# Patient Record
Sex: MALE | Race: WHITE | Employment: FULL TIME | URBAN - NONMETROPOLITAN AREA
[De-identification: names, ages, dates, MRNs, and addresses within clinical notes are randomized per-mention and may not be internally consistent; named-entity substitution may affect disease eponyms.]

---

## 2019-07-13 ENCOUNTER — HOSPITAL ENCOUNTER (EMERGENCY)
Age: 29
Discharge: HOME | End: 2019-07-13
Payer: COMMERCIAL

## 2019-07-13 VITALS
WEIGHT: 182 LBS | HEART RATE: 72 BPM | DIASTOLIC BLOOD PRESSURE: 93 MMHG | SYSTOLIC BLOOD PRESSURE: 141 MMHG | OXYGEN SATURATION: 98 % | HEIGHT: 70 IN | TEMPERATURE: 98.2 F | RESPIRATION RATE: 16 BRPM | BODY MASS INDEX: 26.05 KG/M2

## 2019-07-13 DIAGNOSIS — K02.9 PAIN DUE TO DENTAL CARIES: Primary | ICD-10-CM

## 2019-07-13 PROCEDURE — 99202 OFFICE O/P NEW SF 15 MIN: CPT

## 2019-07-13 PROCEDURE — 99214 OFFICE O/P EST MOD 30 MIN: CPT | Performed by: NURSE PRACTITIONER

## 2019-07-13 RX ORDER — CLINDAMYCIN HYDROCHLORIDE 300 MG/1
300 CAPSULE ORAL 3 TIMES DAILY
Qty: 30 CAPSULE | Refills: 0 | Status: SHIPPED | OUTPATIENT
Start: 2019-07-13 | End: 2019-07-23

## 2019-07-13 RX ORDER — NAPROXEN 500 MG/1
500 TABLET ORAL 2 TIMES DAILY WITH MEALS
Qty: 60 TABLET | Refills: 0 | Status: SHIPPED | OUTPATIENT
Start: 2019-07-13 | End: 2022-01-14 | Stop reason: ALTCHOICE

## 2019-07-13 RX ORDER — CETIRIZINE HYDROCHLORIDE 10 MG/1
10 TABLET ORAL DAILY
COMMUNITY

## 2019-07-13 RX ORDER — LIDOCAINE HYDROCHLORIDE 20 MG/ML
1 SOLUTION OROPHARYNGEAL PRN
Qty: 15 ML | Refills: 0 | Status: SHIPPED | OUTPATIENT
Start: 2019-07-13 | End: 2022-01-14 | Stop reason: ALTCHOICE

## 2019-07-13 ASSESSMENT — PAIN DESCRIPTION - LOCATION: LOCATION: TEETH

## 2019-07-13 ASSESSMENT — PAIN DESCRIPTION - FREQUENCY: FREQUENCY: INTERMITTENT

## 2019-07-13 ASSESSMENT — PAIN SCALES - GENERAL: PAINLEVEL_OUTOF10: 5

## 2019-07-13 ASSESSMENT — ENCOUNTER SYMPTOMS
GASTROINTESTINAL NEGATIVE: 1
EYES NEGATIVE: 1
STRIDOR: 0
SHORTNESS OF BREATH: 0
COUGH: 0

## 2019-07-13 ASSESSMENT — PAIN DESCRIPTION - PAIN TYPE: TYPE: ACUTE PAIN

## 2019-07-13 ASSESSMENT — PAIN DESCRIPTION - DESCRIPTORS: DESCRIPTORS: THROBBING

## 2019-07-13 NOTE — ED PROVIDER NOTES
[phone]       Chief Complaint   Patient presents with    Dental Pain     upper left        Nurses Notes reviewed and I agree except as noted in the HPI. HISTORY OF PRESENT ILLNESS   Miles Rojas is a 30 y.o. male who presents   Dental Pain   Location:  Upper and lower  Upper teeth location:  15/SRI 2nd molar and 16/SRI 3rd molar  Lower teeth location:  17/LL 3rd molar and 18/LL 2nd molar  Quality:  Aching, dull, pressure-like, radiating, sharp, shooting and throbbing  Severity:  Moderate  Onset quality:  Gradual  Duration:  2 weeks  Timing:  Intermittent  Progression:  Worsening  Chronicity:  New  Context: cap fell off and poor dentition    Context: not dental fracture, not malocclusion and not trauma    Previous work-up:  Dental exam  Relieved by:  None tried  Worsened by: Hot food/drink, cold food/drink, touching and jaw movement  Ineffective treatments:  None tried  Associated symptoms: no congestion, no drooling, no facial pain, no fever, no gum swelling, no headaches, no neck swelling, no oral bleeding and no oral lesions    Risk factors: periodontal disease    Risk factors: no diabetes          REVIEW OF SYSTEMS     Review of Systems   Constitutional: Positive for appetite change. Negative for activity change and fever. HENT: Positive for dental problem and ear pain. Negative for congestion, drooling and mouth sores. Eyes: Negative. Respiratory: Negative for cough, shortness of breath and stridor. Cardiovascular: Negative for chest pain and leg swelling. Gastrointestinal: Negative. Endocrine: Negative for cold intolerance and heat intolerance. Genitourinary: Negative. Allergic/Immunologic: Negative for environmental allergies and food allergies. Neurological: Negative for headaches. Hematological: Negative for adenopathy. Does not bruise/bleed easily. Psychiatric/Behavioral: Negative.         PAST MEDICAL

## 2019-07-13 NOTE — ED TRIAGE NOTES
Pt ambulatory into [facility] with c/o upper left tooth pain for the past three or four days. Pt states pain has been intermittent but has gotten worse today and is sensitive to cold water. Pt states pain 5. Pt has dentist at [facility] and has not tried to contact them since a weekend.

## 2019-10-25 ENCOUNTER — HOSPITAL ENCOUNTER (EMERGENCY)
Age: 29
Discharge: HOME | End: 2019-10-25
Attending: EMERGENCY MEDICINE
Payer: COMMERCIAL

## 2019-10-25 VITALS
OXYGEN SATURATION: 96 % | HEART RATE: 76 BPM | DIASTOLIC BLOOD PRESSURE: 84 MMHG | SYSTOLIC BLOOD PRESSURE: 137 MMHG | BODY MASS INDEX: 26.26 KG/M2 | TEMPERATURE: 97.3 F | RESPIRATION RATE: 16 BRPM | WEIGHT: 183 LBS

## 2019-10-25 DIAGNOSIS — J20.9 ACUTE BRONCHITIS DUE TO INFECTION: ICD-10-CM

## 2019-10-25 DIAGNOSIS — J01.00 ACUTE MAXILLARY SINUSITIS, RECURRENCE NOT SPECIFIED: Primary | ICD-10-CM

## 2019-10-25 PROCEDURE — 99213 OFFICE O/P EST LOW 20 MIN: CPT | Performed by: EMERGENCY MEDICINE

## 2019-10-25 PROCEDURE — 99212 OFFICE O/P EST SF 10 MIN: CPT

## 2019-10-25 RX ORDER — DOXYCYCLINE HYCLATE 100 MG
100 TABLET ORAL 2 TIMES DAILY
Qty: 14 TABLET | Refills: 0 | Status: SHIPPED | OUTPATIENT
Start: 2019-10-25 | End: 2019-11-01

## 2019-10-25 RX ORDER — PROMETHAZINE HYDROCHLORIDE AND CODEINE PHOSPHATE 6.25; 1 MG/5ML; MG/5ML
5 SYRUP ORAL 4 TIMES DAILY PRN
Qty: 120 ML | Refills: 0 | Status: SHIPPED | OUTPATIENT
Start: 2019-10-25 | End: 2019-10-29

## 2019-10-25 RX ORDER — CETIRIZINE HYDROCHLORIDE, PSEUDOEPHEDRINE HYDROCHLORIDE 5; 120 MG/1; MG/1
1 TABLET, FILM COATED, EXTENDED RELEASE ORAL 2 TIMES DAILY
Qty: 30 TABLET | Refills: 0 | Status: SHIPPED | OUTPATIENT
Start: 2019-10-25 | End: 2019-11-09

## 2019-10-25 ASSESSMENT — ENCOUNTER SYMPTOMS
SINUS PRESSURE: 1
STRIDOR: 0
TROUBLE SWALLOWING: 0
EYE PAIN: 0
RHINORRHEA: 1
SINUS PAIN: 1
COUGH: 1
VOMITING: 0
VOICE CHANGE: 1
SHORTNESS OF BREATH: 0
EYE DISCHARGE: 0
BACK PAIN: 0
SORE THROAT: 1
ABDOMINAL PAIN: 0
WHEEZING: 0
EYE REDNESS: 0
NAUSEA: 0
DIARRHEA: 0

## 2019-10-25 ASSESSMENT — PAIN SCALES - GENERAL: PAINLEVEL_OUTOF10: 3

## 2019-10-25 ASSESSMENT — PAIN DESCRIPTION - LOCATION: LOCATION: THROAT

## 2019-10-25 ASSESSMENT — PAIN DESCRIPTION - PAIN TYPE: TYPE: ACUTE PAIN

## 2020-09-21 ENCOUNTER — HOSPITAL ENCOUNTER (EMERGENCY)
Age: 30
Discharge: HOME | End: 2020-09-21
Payer: COMMERCIAL

## 2020-09-21 VITALS
HEART RATE: 95 BPM | RESPIRATION RATE: 16 BRPM | SYSTOLIC BLOOD PRESSURE: 141 MMHG | TEMPERATURE: 98.1 F | WEIGHT: 171 LBS | BODY MASS INDEX: 23.94 KG/M2 | DIASTOLIC BLOOD PRESSURE: 78 MMHG | HEIGHT: 71 IN | OXYGEN SATURATION: 97 %

## 2020-09-21 LAB
FLU A ANTIGEN: NEGATIVE
GROUP A STREP CULTURE, REFLEX: NEGATIVE
INFLUENZA B AG, EIA: POSITIVE
REFLEX THROAT C + S: NORMAL

## 2020-09-21 PROCEDURE — 99214 OFFICE O/P EST MOD 30 MIN: CPT | Performed by: NURSE PRACTITIONER

## 2020-09-21 PROCEDURE — 87070 CULTURE OTHR SPECIMN AEROBIC: CPT

## 2020-09-21 PROCEDURE — 87804 INFLUENZA ASSAY W/OPTIC: CPT

## 2020-09-21 PROCEDURE — 87880 STREP A ASSAY W/OPTIC: CPT

## 2020-09-21 PROCEDURE — 99213 OFFICE O/P EST LOW 20 MIN: CPT

## 2020-09-21 RX ORDER — OSELTAMIVIR PHOSPHATE 75 MG/1
75 CAPSULE ORAL 2 TIMES DAILY
Qty: 10 CAPSULE | Refills: 0 | Status: SHIPPED | OUTPATIENT
Start: 2020-09-21 | End: 2020-09-26

## 2020-09-21 RX ORDER — BROMPHENIRAMINE MALEATE, PSEUDOEPHEDRINE HYDROCHLORIDE, AND DEXTROMETHORPHAN HYDROBROMIDE 2; 30; 10 MG/5ML; MG/5ML; MG/5ML
10 SYRUP ORAL 4 TIMES DAILY PRN
Qty: 118 ML | Refills: 0 | Status: SHIPPED | OUTPATIENT
Start: 2020-09-21 | End: 2022-01-14 | Stop reason: ALTCHOICE

## 2020-09-21 ASSESSMENT — ENCOUNTER SYMPTOMS
SORE THROAT: 1
EYE ITCHING: 0
EYE REDNESS: 0
COUGH: 1
SINUS PRESSURE: 0
WHEEZING: 0
ABDOMINAL PAIN: 0
NAUSEA: 0
VOMITING: 0
SHORTNESS OF BREATH: 0
DIARRHEA: 0

## 2020-09-21 ASSESSMENT — PAIN SCALES - GENERAL: PAINLEVEL_OUTOF10: 3

## 2020-09-21 ASSESSMENT — PAIN - FUNCTIONAL ASSESSMENT: PAIN_FUNCTIONAL_ASSESSMENT: ACTIVITIES ARE NOT PREVENTED

## 2020-09-21 ASSESSMENT — PAIN DESCRIPTION - LOCATION: LOCATION: THROAT

## 2020-09-21 ASSESSMENT — PAIN DESCRIPTION - DESCRIPTORS: DESCRIPTORS: DISCOMFORT

## 2020-09-21 ASSESSMENT — PAIN DESCRIPTION - PAIN TYPE: TYPE: ACUTE PAIN

## 2020-09-21 NOTE — ED NOTES
Pt verbalized discharge instructions. Pt informed to go to ER if develop chest pain, shortness of breath or abdominal pain. Pt ambulatory out in stable condition. Assessment unchanged.        Jenn Schilling RN  09/21/20 [id number]

## 2020-09-21 NOTE — ED PROVIDER NOTES
[address]       Chief Complaint   Patient presents with    Cough    Pharyngitis       Nurses Notes reviewed and I agree except as noted in the HPI. HISTORY OF PRESENT ILLNESS   Henrique Brown is a 26 y.o. male who presents for evaluation of a fever, cough, and sore throat that started three days ago. He states that he works out at [employer] , and when reporting for work the other day, he went through the [employer] and was told by the  that he had a temperature of 100 °F.    Patient/patient representative denies any foreign or domestic travel in the last 30 days. Patient /patient representative denies exposure to those with similar symptoms. Patient/patient representative denies contact with someone who was confirmed or suspected to have Coronavirus / COVID-19 within the last month. REVIEW OF SYSTEMS     Review of Systems   Constitutional: Positive for chills and fatigue. Negative for fever. HENT: Positive for sore throat. Negative for congestion, ear pain and sinus pressure. Eyes: Negative for redness and itching. Respiratory: Positive for cough. Negative for shortness of breath and wheezing. Cardiovascular: Negative for chest pain and palpitations. Gastrointestinal: Negative for abdominal pain, diarrhea, nausea and vomiting. Musculoskeletal: Positive for myalgias. Negative for joint swelling. Skin: Negative for rash. Allergic/Immunologic: Negative for environmental allergies and food allergies. Neurological: Negative for dizziness and headaches. PAST MEDICAL HISTORY   History reviewed. No pertinent past medical history. SURGICAL HISTORY     Patient  has a past surgical history that includes Ankle surgery and back surgery.     CURRENT MEDICATIONS       Previous Medications    CETIRIZINE (ZYRTEC) 10 MG TABLET    Take 10 mg by mouth daily    LIDOCAINE VISCOUS HCL (XYLOCAINE) 2 % SOLN SOLUTION

## 2020-09-23 LAB — THROAT/NOSE CULTURE: NORMAL

## 2022-01-14 NOTE — PROGRESS NOTES
In preparation for their surgical procedure above patient was screened for Obstructive Sleep Apnea (POPPY) using the STOP-Bang Questionnaire by the Pre-Admission Testing department. This is a pre-surgical screening tool for patient safety and serves as a recommendation, this WILL NOT cause cancellation of surgery. STOP-Bang Questionnaire  * Do you currently see a pulmonologist?  No     If yes STOP, do not complete. Patient follows with  .    1. Do you snore loudly (able to be heard in the next room)? No    2. Do you often feel tired or sleepy during the daytime? No       3. Has anyone ever told you that you stop breathing during your sleep? No    4. Do you have or are you being treated for high blood pressure? No      5. BMI more than 35? BMI (Calculated): 25.9        No    6. Age over 48 years? 33 y.o. No    7. Neck Circumference greater than 17 inches for male or 16 inches for female? Measured           (visits only)            No    8. Gender Male? Yes      TOTAL SCORE: 1    POPPY - Low Risk : Yes to 0 - 2 questions  POPPY - Intermediate Risk : Yes to 3 - 4 questions  POPPY - High Risk : Yes to 5 - 8 questions    Adapted from:   STOP Questionnaire: A Tool to Screen Patients for Obstructive Sleep Apnea   EBONIE AndersonC.P.C., HUAN Roy.B.B.S., Dalila Osborne M.D., Gisselle Evans. Riaz Lee, Ph.D., HUAN Odonnell.B.B.S., Paulina Donato M.Sc., Tai Tomas M.D., Papa Butts. DEB Donald.P.C.    Anesthesiology 2008; 247:076-13 Copyright 2008, the [address] of Anesthesiologists, [facility].   ----------------------------------------------------------------------------------------------------------------

## 2022-01-14 NOTE — PROGRESS NOTES
Following instructions given to patient, who states understanding:     NPO after midnight  [employer] and 's license  Wear comfortable clean clothing  Do not bring jewelry   Shower night before and morning of surgery with a liquid antibacterial soap  Bring medications in original bottles  Follow all instructions given by your physician   needed at discharge  Call [phone] for any questions  Report to [facility] on 2nd floor  If you would become ill prior to surgery, please call the surgeon  May have only 1 visitor accompany you for surgery  Please bring and wear mask  You will be receiving a phone call one or two days before surgery to review covid screening exposure     Covid screening questionnaire complete and negative for symptoms or exposure see chart for documentation.    Please limit your exposure to the public after you have your covid test  Please call your doctor immediately if you develop any symptoms of covid prior to your surgery

## 2022-01-24 ENCOUNTER — ANESTHESIA EVENT (OUTPATIENT)
Dept: OPERATING ROOM | Age: 32
End: 2022-01-24
Payer: COMMERCIAL

## 2022-01-27 ENCOUNTER — HOSPITAL ENCOUNTER (OUTPATIENT)
Age: 32
Setting detail: OUTPATIENT SURGERY
Discharge: HOME | End: 2022-01-27
Attending: ORTHOPAEDIC SURGERY | Admitting: ORTHOPAEDIC SURGERY
Payer: COMMERCIAL

## 2022-01-27 ENCOUNTER — ANESTHESIA (OUTPATIENT)
Dept: OPERATING ROOM | Age: 32
End: 2022-01-27
Payer: COMMERCIAL

## 2022-01-27 VITALS — OXYGEN SATURATION: 97 % | TEMPERATURE: 96.3 F | SYSTOLIC BLOOD PRESSURE: 124 MMHG | DIASTOLIC BLOOD PRESSURE: 60 MMHG

## 2022-01-27 VITALS
BODY MASS INDEX: 25.96 KG/M2 | OXYGEN SATURATION: 96 % | WEIGHT: 185.4 LBS | HEART RATE: 62 BPM | DIASTOLIC BLOOD PRESSURE: 81 MMHG | SYSTOLIC BLOOD PRESSURE: 132 MMHG | HEIGHT: 71 IN | RESPIRATION RATE: 16 BRPM | TEMPERATURE: 97.9 F

## 2022-01-27 DIAGNOSIS — G89.18 POST-OP PAIN: Primary | ICD-10-CM

## 2022-01-27 PROCEDURE — 3600000014 HC SURGERY LEVEL 4 ADDTL 15MIN: Performed by: ORTHOPAEDIC SURGERY

## 2022-01-27 PROCEDURE — 3700000000 HC ANESTHESIA ATTENDED CARE: Performed by: ORTHOPAEDIC SURGERY

## 2022-01-27 PROCEDURE — 6360000002 HC RX W HCPCS: Performed by: NURSE ANESTHETIST, CERTIFIED REGISTERED

## 2022-01-27 PROCEDURE — 6360000002 HC RX W HCPCS: Performed by: ORTHOPAEDIC SURGERY

## 2022-01-27 PROCEDURE — 2500000003 HC RX 250 WO HCPCS: Performed by: ORTHOPAEDIC SURGERY

## 2022-01-27 PROCEDURE — C1713 ANCHOR/SCREW BN/BN,TIS/BN: HCPCS | Performed by: ORTHOPAEDIC SURGERY

## 2022-01-27 PROCEDURE — 3600000004 HC SURGERY LEVEL 4 BASE: Performed by: ORTHOPAEDIC SURGERY

## 2022-01-27 PROCEDURE — 2500000003 HC RX 250 WO HCPCS: Performed by: NURSE ANESTHETIST, CERTIFIED REGISTERED

## 2022-01-27 PROCEDURE — 3700000001 HC ADD 15 MINUTES (ANESTHESIA): Performed by: ORTHOPAEDIC SURGERY

## 2022-01-27 PROCEDURE — 64415 NJX AA&/STRD BRCH PLXS IMG: CPT | Performed by: STUDENT IN AN ORGANIZED HEALTH CARE EDUCATION/TRAINING PROGRAM

## 2022-01-27 PROCEDURE — 7100000010 HC PHASE II RECOVERY - FIRST 15 MIN: Performed by: ORTHOPAEDIC SURGERY

## 2022-01-27 PROCEDURE — 2580000003 HC RX 258: Performed by: ORTHOPAEDIC SURGERY

## 2022-01-27 PROCEDURE — 7100000000 HC PACU RECOVERY - FIRST 15 MIN: Performed by: ORTHOPAEDIC SURGERY

## 2022-01-27 PROCEDURE — 6360000002 HC RX W HCPCS: Performed by: STUDENT IN AN ORGANIZED HEALTH CARE EDUCATION/TRAINING PROGRAM

## 2022-01-27 PROCEDURE — 7100000011 HC PHASE II RECOVERY - ADDTL 15 MIN: Performed by: ORTHOPAEDIC SURGERY

## 2022-01-27 PROCEDURE — 7100000001 HC PACU RECOVERY - ADDTL 15 MIN: Performed by: ORTHOPAEDIC SURGERY

## 2022-01-27 PROCEDURE — 2709999900 HC NON-CHARGEABLE SUPPLY: Performed by: ORTHOPAEDIC SURGERY

## 2022-01-27 PROCEDURE — 2720000010 HC SURG SUPPLY STERILE: Performed by: ORTHOPAEDIC SURGERY

## 2022-01-27 DEVICE — 1.4MM ICONIX 1 ANCHOR W/INTELLIBRAID TECHNOLOGY 1 STRAND #2 FORCE FIBER
Type: IMPLANTABLE DEVICE | Site: SHOULDER | Status: FUNCTIONAL
Brand: ICONIX

## 2022-01-27 RX ORDER — ONDANSETRON 2 MG/ML
4 INJECTION INTRAMUSCULAR; INTRAVENOUS
Status: DISCONTINUED | OUTPATIENT
Start: 2022-01-27 | End: 2022-01-27 | Stop reason: HOSPADM

## 2022-01-27 RX ORDER — EPINEPHRINE 1 MG/ML
INJECTION, SOLUTION, CONCENTRATE INTRAVENOUS PRN
Status: DISCONTINUED | OUTPATIENT
Start: 2022-01-27 | End: 2022-01-27 | Stop reason: ALTCHOICE

## 2022-01-27 RX ORDER — ONDANSETRON 2 MG/ML
INJECTION INTRAMUSCULAR; INTRAVENOUS PRN
Status: DISCONTINUED | OUTPATIENT
Start: 2022-01-27 | End: 2022-01-27 | Stop reason: SDUPTHER

## 2022-01-27 RX ORDER — ROPIVACAINE HYDROCHLORIDE 5 MG/ML
INJECTION, SOLUTION EPIDURAL; INFILTRATION; PERINEURAL
Status: COMPLETED | OUTPATIENT
Start: 2022-01-27 | End: 2022-01-27

## 2022-01-27 RX ORDER — FENTANYL CITRATE 50 UG/ML
25 INJECTION, SOLUTION INTRAMUSCULAR; INTRAVENOUS EVERY 5 MIN PRN
Status: DISCONTINUED | OUTPATIENT
Start: 2022-01-27 | End: 2022-01-27 | Stop reason: HOSPADM

## 2022-01-27 RX ORDER — PROPOFOL 10 MG/ML
INJECTION, EMULSION INTRAVENOUS PRN
Status: DISCONTINUED | OUTPATIENT
Start: 2022-01-27 | End: 2022-01-27 | Stop reason: SDUPTHER

## 2022-01-27 RX ORDER — DIPHENHYDRAMINE HYDROCHLORIDE 50 MG/ML
12.5 INJECTION INTRAMUSCULAR; INTRAVENOUS
Status: DISCONTINUED | OUTPATIENT
Start: 2022-01-27 | End: 2022-01-27 | Stop reason: HOSPADM

## 2022-01-27 RX ORDER — OXYCODONE HYDROCHLORIDE AND ACETAMINOPHEN 5; 325 MG/1; MG/1
1 TABLET ORAL
Status: DISCONTINUED | OUTPATIENT
Start: 2022-01-27 | End: 2022-01-27 | Stop reason: HOSPADM

## 2022-01-27 RX ORDER — HYDROCODONE BITARTRATE AND ACETAMINOPHEN 5; 325 MG/1; MG/1
1-2 TABLET ORAL EVERY 4 HOURS PRN
Qty: 30 TABLET | Refills: 0 | Status: SHIPPED | OUTPATIENT
Start: 2022-01-27 | End: 2022-02-01

## 2022-01-27 RX ORDER — MIDAZOLAM HYDROCHLORIDE 1 MG/ML
INJECTION INTRAMUSCULAR; INTRAVENOUS PRN
Status: DISCONTINUED | OUTPATIENT
Start: 2022-01-27 | End: 2022-01-27 | Stop reason: SDUPTHER

## 2022-01-27 RX ORDER — MEPERIDINE HYDROCHLORIDE 25 MG/ML
12.5 INJECTION INTRAMUSCULAR; INTRAVENOUS; SUBCUTANEOUS EVERY 5 MIN PRN
Status: DISCONTINUED | OUTPATIENT
Start: 2022-01-27 | End: 2022-01-27 | Stop reason: HOSPADM

## 2022-01-27 RX ORDER — DEXAMETHASONE SODIUM PHOSPHATE 10 MG/ML
INJECTION, EMULSION INTRAMUSCULAR; INTRAVENOUS PRN
Status: DISCONTINUED | OUTPATIENT
Start: 2022-01-27 | End: 2022-01-27 | Stop reason: SDUPTHER

## 2022-01-27 RX ORDER — ROCURONIUM BROMIDE 10 MG/ML
INJECTION, SOLUTION INTRAVENOUS PRN
Status: DISCONTINUED | OUTPATIENT
Start: 2022-01-27 | End: 2022-01-27 | Stop reason: SDUPTHER

## 2022-01-27 RX ORDER — GLYCOPYRROLATE 1 MG/5 ML
SYRINGE (ML) INTRAVENOUS PRN
Status: DISCONTINUED | OUTPATIENT
Start: 2022-01-27 | End: 2022-01-27 | Stop reason: SDUPTHER

## 2022-01-27 RX ORDER — FENTANYL CITRATE 50 UG/ML
50 INJECTION, SOLUTION INTRAMUSCULAR; INTRAVENOUS EVERY 5 MIN PRN
Status: DISCONTINUED | OUTPATIENT
Start: 2022-01-27 | End: 2022-01-27 | Stop reason: HOSPADM

## 2022-01-27 RX ORDER — PROMETHAZINE HYDROCHLORIDE 25 MG/ML
6.25 INJECTION, SOLUTION INTRAMUSCULAR; INTRAVENOUS
Status: DISCONTINUED | OUTPATIENT
Start: 2022-01-27 | End: 2022-01-27 | Stop reason: HOSPADM

## 2022-01-27 RX ORDER — HYDRALAZINE HYDROCHLORIDE 20 MG/ML
5 INJECTION INTRAMUSCULAR; INTRAVENOUS EVERY 10 MIN PRN
Status: DISCONTINUED | OUTPATIENT
Start: 2022-01-27 | End: 2022-01-27 | Stop reason: HOSPADM

## 2022-01-27 RX ORDER — SODIUM CHLORIDE 9 MG/ML
INJECTION, SOLUTION INTRAVENOUS CONTINUOUS
Status: DISCONTINUED | OUTPATIENT
Start: 2022-01-27 | End: 2022-01-27 | Stop reason: HOSPADM

## 2022-01-27 RX ORDER — FENTANYL CITRATE 50 UG/ML
INJECTION, SOLUTION INTRAMUSCULAR; INTRAVENOUS PRN
Status: DISCONTINUED | OUTPATIENT
Start: 2022-01-27 | End: 2022-01-27 | Stop reason: SDUPTHER

## 2022-01-27 RX ORDER — LIDOCAINE HYDROCHLORIDE AND EPINEPHRINE BITARTRATE 20; .01 MG/ML; MG/ML
INJECTION, SOLUTION SUBCUTANEOUS PRN
Status: DISCONTINUED | OUTPATIENT
Start: 2022-01-27 | End: 2022-01-27 | Stop reason: ALTCHOICE

## 2022-01-27 RX ADMIN — SODIUM CHLORIDE: 9 INJECTION, SOLUTION INTRAVENOUS at 10:46

## 2022-01-27 RX ADMIN — FENTANYL CITRATE 50 MCG: 50 INJECTION, SOLUTION INTRAMUSCULAR; INTRAVENOUS at 13:26

## 2022-01-27 RX ADMIN — ROPIVACAINE HYDROCHLORIDE 20 ML: 5 INJECTION, SOLUTION EPIDURAL; INFILTRATION; PERINEURAL at 11:23

## 2022-01-27 RX ADMIN — FENTANYL CITRATE 50 MCG: 50 INJECTION, SOLUTION INTRAMUSCULAR; INTRAVENOUS at 15:08

## 2022-01-27 RX ADMIN — DEXAMETHASONE SODIUM PHOSPHATE 10 MG: 10 INJECTION, EMULSION INTRAMUSCULAR; INTRAVENOUS at 11:58

## 2022-01-27 RX ADMIN — ROCURONIUM BROMIDE 10 MG: 50 INJECTION, SOLUTION INTRAVENOUS at 13:59

## 2022-01-27 RX ADMIN — FENTANYL CITRATE 50 MCG: 50 INJECTION, SOLUTION INTRAMUSCULAR; INTRAVENOUS at 13:12

## 2022-01-27 RX ADMIN — ROCURONIUM BROMIDE 40 MG: 50 INJECTION, SOLUTION INTRAVENOUS at 11:34

## 2022-01-27 RX ADMIN — CEFAZOLIN SODIUM 2000 MG: 10 INJECTION, POWDER, FOR SOLUTION INTRAVENOUS at 12:00

## 2022-01-27 RX ADMIN — SODIUM CHLORIDE: 9 INJECTION, SOLUTION INTRAVENOUS at 10:45

## 2022-01-27 RX ADMIN — FENTANYL CITRATE 100 MCG: 50 INJECTION, SOLUTION INTRAMUSCULAR; INTRAVENOUS at 11:34

## 2022-01-27 RX ADMIN — PROPOFOL 200 MG: 10 INJECTION, EMULSION INTRAVENOUS at 11:34

## 2022-01-27 RX ADMIN — Medication 100 MG: at 11:34

## 2022-01-27 RX ADMIN — FENTANYL CITRATE 50 MCG: 50 INJECTION, SOLUTION INTRAMUSCULAR; INTRAVENOUS at 14:21

## 2022-01-27 RX ADMIN — PROPOFOL 30 MG: 10 INJECTION, EMULSION INTRAVENOUS at 15:05

## 2022-01-27 RX ADMIN — ONDANSETRON 4 MG: 2 INJECTION INTRAMUSCULAR; INTRAVENOUS at 14:25

## 2022-01-27 RX ADMIN — MIDAZOLAM 2 MG: 1 INJECTION INTRAMUSCULAR; INTRAVENOUS at 11:24

## 2022-01-27 RX ADMIN — Medication 0.2 MG: at 11:34

## 2022-01-27 RX ADMIN — SODIUM CHLORIDE: 9 INJECTION, SOLUTION INTRAVENOUS at 12:41

## 2022-01-27 ASSESSMENT — PULMONARY FUNCTION TESTS
PIF_VALUE: 4
PIF_VALUE: 18
PIF_VALUE: 1
PIF_VALUE: 19
PIF_VALUE: 19
PIF_VALUE: 15
PIF_VALUE: 14
PIF_VALUE: 13
PIF_VALUE: 18
PIF_VALUE: 16
PIF_VALUE: 19
PIF_VALUE: 16
PIF_VALUE: 18
PIF_VALUE: 17
PIF_VALUE: 17
PIF_VALUE: 6
PIF_VALUE: 14
PIF_VALUE: 14
PIF_VALUE: 19
PIF_VALUE: 19
PIF_VALUE: 16
PIF_VALUE: 18
PIF_VALUE: 18
PIF_VALUE: 1
PIF_VALUE: 14
PIF_VALUE: 18
PIF_VALUE: 15
PIF_VALUE: 17
PIF_VALUE: 13
PIF_VALUE: 17
PIF_VALUE: 13
PIF_VALUE: 12
PIF_VALUE: 18
PIF_VALUE: 1
PIF_VALUE: 18
PIF_VALUE: 14
PIF_VALUE: 18
PIF_VALUE: 18
PIF_VALUE: 14
PIF_VALUE: 18
PIF_VALUE: 15
PIF_VALUE: 18
PIF_VALUE: 16
PIF_VALUE: 13
PIF_VALUE: 14
PIF_VALUE: 18
PIF_VALUE: 14
PIF_VALUE: 19
PIF_VALUE: 15
PIF_VALUE: 17
PIF_VALUE: 19
PIF_VALUE: 12
PIF_VALUE: 14
PIF_VALUE: 14
PIF_VALUE: 18
PIF_VALUE: 16
PIF_VALUE: 14
PIF_VALUE: 19
PIF_VALUE: 18
PIF_VALUE: 15
PIF_VALUE: 2
PIF_VALUE: 18
PIF_VALUE: 15
PIF_VALUE: 19
PIF_VALUE: 1
PIF_VALUE: 18
PIF_VALUE: 19
PIF_VALUE: 18
PIF_VALUE: 12
PIF_VALUE: 16
PIF_VALUE: 13
PIF_VALUE: 16
PIF_VALUE: 18
PIF_VALUE: 16
PIF_VALUE: 1
PIF_VALUE: 16
PIF_VALUE: 19
PIF_VALUE: 15
PIF_VALUE: 15
PIF_VALUE: 18
PIF_VALUE: 19
PIF_VALUE: 19
PIF_VALUE: 18
PIF_VALUE: 18
PIF_VALUE: 13
PIF_VALUE: 14
PIF_VALUE: 14
PIF_VALUE: 18
PIF_VALUE: 14
PIF_VALUE: 19
PIF_VALUE: 14
PIF_VALUE: 17
PIF_VALUE: 19
PIF_VALUE: 13
PIF_VALUE: 4
PIF_VALUE: 16
PIF_VALUE: 13
PIF_VALUE: 19
PIF_VALUE: 17
PIF_VALUE: 14
PIF_VALUE: 18
PIF_VALUE: 19
PIF_VALUE: 14
PIF_VALUE: 18
PIF_VALUE: 18
PIF_VALUE: 14
PIF_VALUE: 13
PIF_VALUE: 18
PIF_VALUE: 19
PIF_VALUE: 18
PIF_VALUE: 1
PIF_VALUE: 18
PIF_VALUE: 16
PIF_VALUE: 18
PIF_VALUE: 19
PIF_VALUE: 15
PIF_VALUE: 14
PIF_VALUE: 22
PIF_VALUE: 18
PIF_VALUE: 13
PIF_VALUE: 18
PIF_VALUE: 19
PIF_VALUE: 19
PIF_VALUE: 1
PIF_VALUE: 13
PIF_VALUE: 19
PIF_VALUE: 18
PIF_VALUE: 19
PIF_VALUE: 17
PIF_VALUE: 19
PIF_VALUE: 14
PIF_VALUE: 13
PIF_VALUE: 19
PIF_VALUE: 14
PIF_VALUE: 17
PIF_VALUE: 18
PIF_VALUE: 14
PIF_VALUE: 14
PIF_VALUE: 18
PIF_VALUE: 19
PIF_VALUE: 14
PIF_VALUE: 19
PIF_VALUE: 54
PIF_VALUE: 13
PIF_VALUE: 15
PIF_VALUE: 18
PIF_VALUE: 2
PIF_VALUE: 8
PIF_VALUE: 18
PIF_VALUE: 17
PIF_VALUE: 18
PIF_VALUE: 15
PIF_VALUE: 19
PIF_VALUE: 19
PIF_VALUE: 14
PIF_VALUE: 14
PIF_VALUE: 18
PIF_VALUE: 19
PIF_VALUE: 16
PIF_VALUE: 17
PIF_VALUE: 19
PIF_VALUE: 18
PIF_VALUE: 18
PIF_VALUE: 17
PIF_VALUE: 19
PIF_VALUE: 1
PIF_VALUE: 17
PIF_VALUE: 13
PIF_VALUE: 1
PIF_VALUE: 1
PIF_VALUE: 19
PIF_VALUE: 18
PIF_VALUE: 19
PIF_VALUE: 15
PIF_VALUE: 1
PIF_VALUE: 19
PIF_VALUE: 15
PIF_VALUE: 18
PIF_VALUE: 19
PIF_VALUE: 18
PIF_VALUE: 16
PIF_VALUE: 16
PIF_VALUE: 2
PIF_VALUE: 16
PIF_VALUE: 18
PIF_VALUE: 18
PIF_VALUE: 14
PIF_VALUE: 13
PIF_VALUE: 13
PIF_VALUE: 1
PIF_VALUE: 14
PIF_VALUE: 19
PIF_VALUE: 16
PIF_VALUE: 19
PIF_VALUE: 18
PIF_VALUE: 13
PIF_VALUE: 1
PIF_VALUE: 12
PIF_VALUE: 1
PIF_VALUE: 19
PIF_VALUE: 13

## 2022-01-27 ASSESSMENT — PAIN SCALES - GENERAL
PAINLEVEL_OUTOF10: 0

## 2022-01-27 ASSESSMENT — PAIN - FUNCTIONAL ASSESSMENT: PAIN_FUNCTIONAL_ASSESSMENT: 0-10

## 2022-01-27 NOTE — ANESTHESIA PROCEDURE NOTES
Peripheral Block    Patient location during procedure: PACU  Start time: 1/27/2022 11:19 AM  End time: 1/27/2022 11:24 AM  Staffing  Performed: anesthesiologist   Anesthesiologist: Diomedes Hamilton DO  Preanesthetic Checklist  Completed: patient identified, IV checked, site marked, risks and benefits discussed, surgical consent, monitors and equipment checked, pre-op evaluation, timeout performed, anesthesia consent given, oxygen available and patient being monitored  Peripheral Block  Patient position: supine  Prep: ChloraPrep  Patient monitoring: cardiac monitor, continuous pulse ox and IV access  Block type: Brachial plexus  Laterality: left  Injection technique: single-shot  Guidance: ultrasound guided  Interscalene  Provider prep: mask and sterile gloves  Needle  Needle type: combined needle/nerve stimulator   Needle gauge: 22 G  Needle length: 5 cm  Needle localization: ultrasound guidance  Assessment  Injection assessment: negative aspiration for heme, no paresthesia on injection and local visualized surrounding nerve on ultrasound  Paresthesia pain: none  Slow fractionated injection: yes  Hemodynamics: stable  Medications Administered  Ropivacaine (NAROPIN) injection 0.5%, 20 mL  Reason for block: post-op pain management

## 2022-01-27 NOTE — H&P
Orthopedic H&P      CHIEF COMPLAINT:  Left shoulder pain     HISTORY OF PRESENT ILLNESS:      The patient is a 33 y.o. male with left shoulder pain here for shoulder arthroscopy. No new complaints. Past Medical History:    History reviewed. No pertinent past medical history. Past Surgical History:    Past Surgical History:   Procedure Laterality Date    ANKLE SURGERY Right 2012    BACK SURGERY         Medications Prior to Admission:   Prior to Admission medications    Medication Sig Start Date End Date Taking? Authorizing Provider   cetirizine (ZYRTEC) 10 MG tablet Take 10 mg by mouth daily   Yes Historical Provider, MD       Allergies:    Patient has no known allergies. Social History:   Social History     Socioeconomic History    Marital status: Single     Spouse name: None    Number of children: None    Years of education: None    Highest education level: None   Occupational History    None   Tobacco Use    Smoking status: Former Smoker     Types: Cigars    Smokeless tobacco: Never Used   Vaping Use    Vaping Use: Never used   Substance and Sexual Activity    Alcohol use: Yes     Alcohol/week: 3.0 standard drinks     Types: 3 Cans of beer per week     Comment: occas. weekly    Drug use: Yes     Types: Marijuana Shavonne Anabel)     Comment: 1/2022    Sexual activity: Yes     Partners: Female   Other Topics Concern    None   Social History Narrative    None     Social Determinants of Health     Financial Resource Strain:     Difficulty of Paying Living Expenses: Not on file   Food Insecurity:     Worried About Running Out of Food in the Last Year: Not on file    Noé of Food in the Last Year: Not on file   Transportation Needs:     Lack of Transportation (Medical): Not on file    Lack of Transportation (Non-Medical):  Not on file   Physical Activity:     Days of Exercise per Week: Not on file    Minutes of Exercise per Session: Not on file   Stress:     Feeling of Stress : Not on file Social Connections:     Frequency of Communication with Friends and Family: Not on file    Frequency of Social Gatherings with Friends and Family: Not on file    Attends Pent[employer]ism Services: Not on file    Active Member of Clubs or Organizations: Not on file    Attends Club or Organization Meetings: Not on file    Marital Status: Not on file   Intimate Partner Violence:     Fear of Current or Ex-Partner: Not on file    Emotionally Abused: Not on file    Physically Abused: Not on file    Sexually Abused: Not on file   Housing Stability:     Unable to Pay for Housing in the Last Year: Not on file    Number of [place] in the Last Year: Not on file    Unstable Housing in the Last Year: Not on file       Family History:  Family History   Problem Relation Age of Onset    Arthritis Father     Diabetes Father     Obesity Father     High Blood Pressure Father          REVIEW OF SYSTEMS:  General: No fever or chills  Respiratory: no cough or wheezing  Cardiovascular: no chest pain or dyspnea   Gastrointestinal ROS: no nausea no vomiting   MSK: Left shoulder pain    PHYSICAL EXAM:  Blood pressure 138/89, pulse 65, temperature 98.8 °F (37.1 °C), temperature source Tympanic, resp. rate 20, height 5' 11\" (1.803 m), weight 185 lb 6.4 oz (84.1 kg), SpO2 96 %. Gen: alert and oriented  Head: normocephalic and atraumatic   Neck: supple  Chest: Non labored breathing   Heart: Reg rate   Extremity: LUE: Skin intact with no signs of infection at the surgical site. Soft compartments. Hand well perfused and SILT shoulder. See office note for detailed shoulder exam.         LABS:  No results for input(s): WBC, HGB, HCT, PLT, INR, PTT, NA, K, BUN, CREATININE, GLUCOSE in the last 72 hours.     Invalid input(s): PT     Radiology:   Reviewed     A/P: 33 y.o. male left shoulder AC arthritis, SLAP tear, partial subscap tear with bicep subluxation chronic anterior inferior labral tear   RBA's to surgery discussed   Pt elected to proceed  Site marked and patient consented  To OR for Left SS with debridement, AC joint resection and possible repairs, possible BT  NPO  Abx OCTOR  Post op Plan: DC home post op       Electronically signed by Andry Wahl DO on 1/27/2022 at 11:18 AM

## 2022-01-27 NOTE — ANESTHESIA POSTPROCEDURE EVALUATION
Department of Anesthesiology  Postprocedure Note    Patient: Daria Vergara  MRN: [id number]  YOB: 1990  Date of evaluation: 1/27/2022  Time:  3:45 PM     Procedure Summary     Date: 01/27/22 [facility] [place] [address] [facility]    Anesthesia Start: 1127 Anesthesia Stop: 1514    Procedure: LEFT SHOULDER ARTHROSCOPY DEBRIDEMENT POSSIBLE LABRAL REPAIR POSSIBLE SUBSCAPULARIS REPAIR POSSIBLE BICEP TENODESIS DISTAL CLAVICLE EXCISION (Left Shoulder) Diagnosis: (LEFT SHOULDER PAIN SLAP TEAR SUBSCAP TEAR AC JOINT ARTHRITIS)    Surgeons: Tyrese Pearson DO Responsible Provider: Soheila Waldron DO    Anesthesia Type: general, regional ASA Status: 1          Anesthesia Type: general, regional    Flip Phase I: Flip Score: 8    Flip Phase II:      Last vitals: Reviewed and per EMR flowsheets.        Anesthesia Post Evaluation    Patient location during evaluation: PACU  Patient participation: complete - patient participated  Level of consciousness: awake and alert  Airway patency: patent  Nausea & Vomiting: no vomiting and no nausea  Complications: no  Cardiovascular status: hemodynamically stable  Respiratory status: acceptable  Hydration status: stable

## 2022-01-27 NOTE — ANESTHESIA PRE PROCEDURE
Department of Anesthesiology  Preprocedure Note       Name:  Abdias Urbina   Age:  33 y.o.  :  1990                                          MRN:  [id number]         Date:  2022      Surgeon: Lucy Lawton):  Maurice Santos, DO    Procedure: Procedure(s):  LEFT SHOULDER ARTHROSCOPY DEBRIDEMENT POSSIBLE LABRAL REPAIR POSSIBLE SUBSCAPULARIS REPAIR POSSIBLE BICEP TENODESIS DISTAL CLAVICLE EXCISION    Medications prior to admission:   Prior to Admission medications    Medication Sig Start Date End Date Taking? Authorizing Provider   cetirizine (ZYRTEC) 10 MG tablet Take 10 mg by mouth daily   Yes Historical Provider, MD       Current medications:    Current Facility-Administered Medications   Medication Dose Route Frequency Provider Last Rate Last Admin    0.9 % sodium chloride infusion   IntraVENous Continuous Brightdanemo Erp, DO 50 mL/hr at 22 1045 New Bag at 22 1045    ceFAZolin (ANCEF) 2000 mg in dextrose 5 % 50 mL IVPB  2,000 mg IntraVENous Once Rendall Erp, DO           Allergies:  No Known Allergies    Problem List:  There is no problem list on file for this patient. Past Medical History:  History reviewed. No pertinent past medical history. Past Surgical History:        Procedure Laterality Date    ANKLE SURGERY Right 2012    BACK SURGERY         Social History:    Social History     Tobacco Use    Smoking status: Former Smoker     Types: Cigars    Smokeless tobacco: Never Used   Substance Use Topics    Alcohol use: Yes     Alcohol/week: 3.0 standard drinks     Types: 3 Cans of beer per week     Comment: occas.  weekly                                Counseling given: Not Answered      Vital Signs (Current):   Vitals:    22 0823 22 0943 22 1030   BP:   138/89   Pulse:   65   Resp:   20   Temp:   98.8 °F (37.1 °C)   TempSrc:   Tympanic   SpO2:   96%   Weight: 185 lb (83.9 kg) 185 lb (83.9 kg) 185 lb 6.4 oz (84.1 kg)   Height: 5' 11\" (1.803 m) 5' 11\" (1.803 m)                                               BP Readings from Last 3 Encounters:   01/27/22 138/89   09/21/20 (!) 141/78   10/25/19 137/84       NPO Status: Time of last liquid consumption: 2200                        Time of last solid consumption: 2200                        Date of last liquid consumption: 01/26/22                        Date of last solid food consumption: 01/26/22    BMI:   Wt Readings from Last 3 Encounters:   01/27/22 185 lb 6.4 oz (84.1 kg)   09/21/20 171 lb (77.6 kg)   10/25/19 183 lb (83 kg)     Body mass index is 25.86 kg/m². CBC:   Lab Results   Component Value Date    WBC 10.5 04/30/2014    RBC 5.02 04/30/2014    HGB 15.0 04/30/2014    HCT 44.0 04/30/2014    MCV 87.5 04/30/2014    RDW 12.6 04/30/2014     04/30/2014       CMP:   Lab Results   Component Value Date     04/30/2014    K 3.4 04/30/2014     04/30/2014    CO2 27 04/30/2014    BUN 10 04/30/2014    CREATININE 1.1 04/30/2014    LABGLOM 81 04/30/2014    GLUCOSE 104 04/30/2014    CALCIUM 9.3 04/30/2014       POC Tests: No results for input(s): POCGLU, POCNA, POCK, POCCL, POCBUN, POCHEMO, POCHCT in the last 72 hours.     Coags: No results found for: PROTIME, INR, APTT    HCG (If Applicable): No results found for: PREGTESTUR, PREGSERUM, HCG, HCGQUANT     ABGs: No results found for: PHART, PO2ART, XYC1PKM, XWF7LAL, BEART, D1UTGDDS     Type & Screen (If Applicable):  No results found for: LABABO, LABRH    Drug/Infectious Status (If Applicable):  No results found for: HIV, HEPCAB    COVID-19 Screening (If Applicable): No results found for: COVID19        Anesthesia Evaluation  Patient summary reviewed no history of anesthetic complications:   Airway: Mallampati: I  TM distance: >3 FB   Neck ROM: full  Mouth opening: > = 3 FB Dental:          Pulmonary:normal exam        (-) COPD and asthma                           Cardiovascular:Negative CV ROS  Exercise tolerance: good (>4 METS),       (-) hypertension, past MI and CAD      Rhythm: regular  Rate: normal                    Neuro/Psych:      (-) seizures and CVA           GI/Hepatic/Renal:        (-) GERD, liver disease and no renal disease       Endo/Other:        (-) diabetes mellitus, hypothyroidism, hyperthyroidism               Abdominal:             Vascular:     - DVT. Other Findings:             Anesthesia Plan      general and regional     ASA 1       Induction: intravenous. Anesthetic plan and risks discussed with patient. Plan discussed with CRNA.                   Arpan Ivy DO   1/27/2022

## 2022-01-27 NOTE — PROGRESS NOTES
Pt called in stating he was told by OIO to call PAT for his arrival time for today's surgery. Patient states he has been rescheduled 3 times. This nurse attempted to reach someone in the office at [facility] but no one in and the triage department is not in yet either. Due to being unable to reach anyone, pt instructed to arrive at 10:30 due to his surgery time on the OR schedule is 12:30.

## 2022-01-27 NOTE — BRIEF OP NOTE
Brief Postoperative Note      Patient: Eusebio Voss  YOB: 1990  MRN: [id number]    Date of Procedure: 1/27/2022    Pre-Op Diagnosis: LEFT SHOULDER PAIN SLAP TEAR SUBSCAP TEAR AC JOINT ARTHRITIS    Post-Op Diagnosis: Left shoulder SLAP tear from 11:00-4:00, AC joint arthritis        Procedure(s):  LEFT SHOULDER ARTHROSCOPY DEBRIDEMENT, SLAP REPAIR, DISTAL CLAVICLE EXCISION    Surgeon(s):  Mike Wyatt DO    Assistant:  * No surgical staff found *    Anesthesia: General    Estimated Blood Loss (mL): Minimal    Complications: None    Specimens:   * No specimens in log *    Implants:  Implant Name Type Inv.  Item Serial No.  Lot No. LRB No. Used Action   ANCHOR SUT DIA1.4MM W/ 1 STRND NO2 FORC [employer]BR INTELL[employer]ID  ANCHOR SUT DIA1.4MM W/ 1 STRND NO2 FORC FBR INTELLIBRAID  [employer] [id number] Left 1 Implanted   ANCHOR SUT DIA1.4MM W/ 1 STRND NO2 FORC FBR INTELLIBRAID  ANCHOR SUT DIA1.4MM W/ 1 STRND NO2 FORC FBR INTELLIBRAID  [employer] [id number] Left 1 Implanted   ANCHOR SUT DIA1.4MM W/ 1 STRND NO2 FORC FBR INTELLIBRAID  ANCHOR SUT DIA1.4MM W/ 1 STRND NO2 FORC FBR INTELLIBRAID  [employer] [id number] Left 1 Implanted   ANCHOR SUT DIA1.4MM W/ 1 STRND NO2 FORC FBR INTELLIBRAID  ANCHOR SUT DIA1.4MM W/ 1 STRND NO2 FORC FBR INTELLIBRAID  [employer] [id number] Left 1 Implanted   ANCHOR SUT DIA1.4MM W/ 1 STRND NO2 FORC FBR INTELLIBRAID  ANCHOR SUT DIA1.4MM W/ 1 STRND NO2 FORC FBR INTELLIBRAID  [employer] [id number] Left 1 Implanted         Drains: * No LDAs found *    Findings: post op diagnosis confirmed, sammy complex noted and intact RC    Electronically signed by Mike Wyatt DO on 1/27/2022 at 3:01 PM

## 2022-01-28 NOTE — OP NOTE
[address] [place] [address]                                OPERATIVE REPORT    PATIENT NAME: Marlin Rojas              :        1990  MED REC NO:   [id number]                           ROOM:  ACCOUNT NO:   [de-identified]                           ADMIT DATE: 2022  PROVIDER:     Yang Argueta D.O.    DATE OF PROCEDURE:  2022    PREOPERATIVE DIAGNOSES:  Left shoulder SLAP tear, AC joint arthritis,  possible subscapularis tear, possible anterior-inferior chronic labral  tear. POSTOPERATIVE DIAGNOSES:  Left shoulder SLAP tear from approximately 11  o'clock to 4 o'clock on the glenoid, AC joint arthritis. OPERATION PERFORMED:  Left shoulder arthroscopy with superior labral  repair extending posterior from approximately 11 o'clock to 4 o'clock,  distal clavicle excision, extensive debridement of labrum and  subacromial bursa. SURGEON:  Yang Argueta DO    ASSISTANT:  YUE Bonilla3    TYPE OF ANESTHESIA:  General and regional.    BLOOD LOSS:  Minimal.    IMPLANTS:  Four Pedro 1.5 Iconix anchors. INDICATIONS FOR OPERATION:  The patient is a 77-year-old male with  longstanding right shoulder pain. He describes a couple injuries in the  past.  MRI was concerning for findings of a SLAP tear and possible  subscapularis tear. I recommended shoulder arthroscopy. The risks,  benefits and alternatives were reviewed. The patient elected to  proceed. OPERATIVE SUMMARY:  The patient was met in the preoperative holding area  and the correct extremity was identified and marked. Informed consent  was obtained. A nerve block was performed per the Anesthesia team.  The  patient was escorted to operative suite. General anesthesia was  administered. He was positioned in semi-sitting beach-chair position. I performed an examination under anesthesia.   There was excellent range  of motion of approximately 180 degrees of forward elevation, 80 degrees  of external rotation in the side, 120 degrees of external rotation and  abduction, and 80 degrees of internal rotation. There maybe just slight  instability more posterior than anterior. The patient was then prepped  and draped in standard surgical fashion. Time-out was taken. Correct  patient, procedure and operative site were agreed upon by all that were  present. I marked out the bony landmarks and injected the planned  portal sites with lidocaine with epinephrine for hemostasis effect. I  also injected the subacromial space. I insufflated the joint with  normal saline. I then made a stab incision for standard posterior  viewing portal.  I gained entry to the joint with a blunt obturator. I  began the diagnostic arthroscopy. There was obvious fraying of the  anterior labrum as well as the superior and posterior labrum. Rotator  cuff appeared to be intact. I made an anterior working portal high in  the rotator interval with a spinal needle using an outside-in technique. An 8 mm cannula was placed. I continued the diagnostic arthroscopy. I  began by debriding the anterior labrum all the way to the  posterior-inferior aspect. It became evident there was an obvious SLAP  tear. There was also a [place] complex. There was no anterior-inferior  labral tearing. I prepared the bony surfaces of the glenoid for a later  labral repair. I then assessed the subscapularis, there was note  tearing here or of the superior rotator cuff. The biceps was pulled  into the joint. There was no evidence of biceps subluxation or  inflammation along the tendon. I then began a labral repair. I started  with an anchor at approximately 11 o'clock position just anterior to the  biceps anchor. I passed sutures in a horizontal mattress fashion and  tied them with standard arthroscopic technique.   I then placed four more  anchors at approximately 12 o'clock just posterior to the biceps anchor,  01:30, 02:30, and approximately 4 o'clock. One of the anchors had to be  placed to an accessory percutaneous portal of the anterolateral acromion  just anterior to the supraspinatus tendon. I completed the labral  repair and then transitioned to subacromial space. There was  significant hypertrophic subacromial bursa and a complete bursectomy was  performed. I exposed the [facility] joint. There was hypertrophic capsulitis  present. This was debrided with the ablator. I then used a 5.5 bur to  remove the osteophyte on the acromial side followed by a distal clavicle  resection. The bursal side of the rotator cuff was very healthy. Bony  debris and fluid was suctioned from the subacromial space. Portals were  closed with 3-0 Prolene suture. Sterile dressing was applied. UltraSling was applied. The patient was awakened from anesthesia. There were no complications. He will follow up in two weeks for suture  removal and to begin physical therapy.         Tri Patel D.O.    D: 01/27/2022 15:28:14       T: 01/27/2022 15:34:44     [email or web address]  Job#: [id number]     Doc#: [id number]    CC:

## 2023-02-25 ENCOUNTER — HOSPITAL ENCOUNTER (EMERGENCY)
Age: 33
Discharge: HOME | End: 2023-02-25
Payer: COMMERCIAL

## 2023-02-25 VITALS
HEART RATE: 90 BPM | OXYGEN SATURATION: 100 % | SYSTOLIC BLOOD PRESSURE: 142 MMHG | DIASTOLIC BLOOD PRESSURE: 60 MMHG | TEMPERATURE: 99 F | RESPIRATION RATE: 20 BRPM

## 2023-02-25 DIAGNOSIS — J02.0 STREPTOCOCCAL SORE THROAT: Primary | ICD-10-CM

## 2023-02-25 LAB — S PYO AG THROAT QL: POSITIVE

## 2023-02-25 PROCEDURE — 87651 STREP A DNA AMP PROBE: CPT

## 2023-02-25 PROCEDURE — 99213 OFFICE O/P EST LOW 20 MIN: CPT | Performed by: NURSE PRACTITIONER

## 2023-02-25 PROCEDURE — 99213 OFFICE O/P EST LOW 20 MIN: CPT

## 2023-02-25 RX ORDER — AMOXICILLIN AND CLAVULANATE POTASSIUM 875; 125 MG/1; MG/1
1 TABLET, FILM COATED ORAL 2 TIMES DAILY
Qty: 20 TABLET | Refills: 0 | Status: SHIPPED | OUTPATIENT
Start: 2023-02-25 | End: 2023-03-07

## 2023-02-25 ASSESSMENT — ENCOUNTER SYMPTOMS: SORE THROAT: 1

## 2023-02-25 ASSESSMENT — PAIN - FUNCTIONAL ASSESSMENT: PAIN_FUNCTIONAL_ASSESSMENT: NONE - DENIES PAIN

## 2023-02-25 NOTE — ED PROVIDER NOTES
[address] Encounter      CHIEFCOMPLAINT       Chief Complaint   Patient presents with    Pharyngitis    Cough       Nurses Notes reviewed and I agree except as noted in the HPI. HISTORY OF PRESENT ILLNESS   Amy Arce is a 26 y.o. male who presents in care complaining of cough and sore throat for approximately 5 days. He has had some fatigue and fevers as well. Reports that he is not feeling any better. REVIEW OF SYSTEMS     Review of Systems   Constitutional:  Positive for fatigue and fever. HENT:  Positive for sore throat. PAST MEDICAL HISTORY   History reviewed. No pertinent past medical history. SURGICAL HISTORY     Patient  has a past surgical history that includes Ankle surgery (Right, 2012); back surgery; and Shoulder arthroscopy (Left, 1/27/2022). CURRENT MEDICATIONS       Discharge Medication List as of 2/25/2023 12:15 PM        CONTINUE these medications which have NOT CHANGED    Details   cetirizine (ZYRTEC) 10 MG tablet Take 10 mg by mouth dailyHistorical Med             ALLERGIES     Patient is has No Known Allergies. FAMILY HISTORY     Patient'sfamily history includes Arthritis in his father; Diabetes in his father; High Blood Pressure in his father; Obesity in his father. SOCIAL HISTORY     Patient  reports that he has quit smoking. His smoking use included cigars. He has never used smokeless tobacco. He reports current alcohol use of about 3.0 standard drinks per week. He reports current drug use. Drug: Marijuana Lupe Ege). PHYSICAL EXAM     ED TRIAGE VITALS  BP: (!) 142/60, Temp: 99 °F (37.2 °C), Heart Rate: 90, Resp: 20, SpO2: 100 %  Physical Exam  Constitutional:       General: He is not in acute distress. Appearance: He is well-developed. He is not ill-appearing or toxic-appearing. HENT:      Head: Normocephalic and atraumatic.       Right Ear: Tympanic membrane normal.      Left Ear: Tympanic membrane normal. Nose: No congestion or rhinorrhea. Mouth/Throat:      Mouth: Mucous membranes are moist.      Pharynx: Oropharynx is clear. Posterior oropharyngeal erythema present. No pharyngeal swelling or uvula swelling. Tonsils: 0 on the right. 0 on the left. Eyes:      Conjunctiva/sclera: Conjunctivae normal.      Pupils: Pupils are equal, round, and reactive to light. Cardiovascular:      Rate and Rhythm: Normal rate and regular rhythm. Heart sounds: No murmur heard. Pulmonary:      Effort: Pulmonary effort is normal.      Breath sounds: Normal breath sounds. No wheezing. Abdominal:      General: Bowel sounds are normal.      Palpations: Abdomen is soft. Tenderness: There is no abdominal tenderness. Musculoskeletal:      Cervical back: Normal range of motion and neck supple. Lymphadenopathy:      Cervical: No cervical adenopathy. Skin:     General: Skin is warm and dry. Capillary Refill: Capillary refill takes less than 2 seconds. Neurological:      General: No focal deficit present. Mental Status: He is alert and oriented to person, place, and time. Psychiatric:         Mood and Affect: Mood normal.         Behavior: Behavior normal.       DIAGNOSTIC RESULTS   Labs:  Results for orders placed or performed during the hospital encounter of 02/25/23   Strep Screen Group A Throat   Result Value Ref Range    Rapid Strep A Screen POSITIVE (A)        IMAGING:  No orders to display     URGENT CARE COURSE:         Medications - No data to display  PROCEDURES:  FINALIMPRESSION      1. Streptococcal sore throat        DISPOSITION/PLAN   DISPOSITION Decision To Discharge 02/25/2023 12:14:32 PM    Strep test is positive. Take medications as prescribed. Continue acetaminophen and ibuprofen as needed for pain. Use warm salt water gargles, cough drops, Chloraseptic spray. Follow up with PCP in days if no better. Meds as prescribed. Increase fluids. If worse return or go to ER.   Will provide work or school note as requested.        PATIENT REFERRED TO:  Melissa Álvarez MD  [address]  [facility] [address]  [address] [facility]  [phone]        DISCHARGE MEDICATIONS:  Discharge Medication List as of 2/25/2023 12:15 PM        START taking these medications    Details   amoxicillin-clavulanate (AUGMENTIN) 875-125 MG per tablet Take 1 tablet by mouth 2 times daily for 10 days, Disp-20 tablet, R-0Normal           Discharge Medication List as of 2/25/2023 12:15 PM          Antione Finch APRN - CNP        ANURAG Trent - CNP  02/25/23 1211

## 2023-02-25 NOTE — DISCHARGE INSTRUCTIONS
Strep test is positive. Take medications as prescribed. Continue acetaminophen and ibuprofen as needed for pain. Use warm salt water gargles, cough drops, Chloraseptic spray. Follow up with PCP in days if no better. Meds as prescribed. Increase fluids. If worse return or go to ER. Will provide work or school note as requested.

## 2023-03-08 ENCOUNTER — HOSPITAL ENCOUNTER (EMERGENCY)
Age: 33
Discharge: HOME | End: 2023-03-08
Payer: COMMERCIAL

## 2023-03-08 ENCOUNTER — APPOINTMENT (OUTPATIENT)
Dept: GENERAL RADIOLOGY | Age: 33
End: 2023-03-08
Payer: COMMERCIAL

## 2023-03-08 VITALS
BODY MASS INDEX: 26.5 KG/M2 | DIASTOLIC BLOOD PRESSURE: 106 MMHG | OXYGEN SATURATION: 97 % | TEMPERATURE: 98.9 F | WEIGHT: 190 LBS | RESPIRATION RATE: 16 BRPM | SYSTOLIC BLOOD PRESSURE: 159 MMHG | HEART RATE: 95 BPM
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
## 2023-03-08 DIAGNOSIS — L08.9 INFECTED HUMAN BITE: Primary | ICD-10-CM

## 2023-03-08 PROCEDURE — 6360000002 HC RX W HCPCS: Performed by: NURSE PRACTITIONER

## 2023-03-08 PROCEDURE — 99213 OFFICE O/P EST LOW 20 MIN: CPT

## 2023-03-08 PROCEDURE — 90471 IMMUNIZATION ADMIN: CPT | Performed by: NURSE PRACTITIONER

## 2023-03-08 PROCEDURE — 99212 OFFICE O/P EST SF 10 MIN: CPT | Performed by: NURSE PRACTITIONER

## 2023-03-08 PROCEDURE — 73140 X-RAY EXAM OF FINGER(S): CPT

## 2023-03-08 PROCEDURE — 90715 TDAP VACCINE 7 YRS/> IM: CPT | Performed by: NURSE PRACTITIONER

## 2023-03-08 RX ORDER — ACETAMINOPHEN 650 MG
TABLET, EXTENDED RELEASE ORAL PRN
Status: DISCONTINUED | OUTPATIENT
Start: 2023-03-08 | End: 2023-03-08 | Stop reason: HOSPADM

## 2023-03-08 RX ORDER — AMOXICILLIN AND CLAVULANATE POTASSIUM 875; 125 MG/1; MG/1
1 TABLET, FILM COATED ORAL 2 TIMES DAILY
Qty: 14 TABLET | Refills: 0 | Status: SHIPPED | OUTPATIENT
Start: 2023-03-08 | End: 2023-03-15

## 2023-03-08 RX ADMIN — Medication: at 14:16

## 2023-03-08 RX ADMIN — TETANUS TOXOID, REDUCED DIPHTHERIA TOXOID AND ACELLULAR PERTUSSIS VACCINE, ADSORBED 0.5 ML: 5; 2.5; 8; 8; 2.5 SUSPENSION INTRAMUSCULAR at 14:17

## 2023-03-08 ASSESSMENT — ENCOUNTER SYMPTOMS
CHEST TIGHTNESS: 0
COUGH: 0
SHORTNESS OF BREATH: 0
CHOKING: 0
APNEA: 0
STRIDOR: 0
WHEEZING: 0

## 2023-03-08 ASSESSMENT — PAIN DESCRIPTION - FREQUENCY: FREQUENCY: INTERMITTENT

## 2023-03-08 ASSESSMENT — PAIN DESCRIPTION - LOCATION: LOCATION: FINGER (COMMENT WHICH ONE)

## 2023-03-08 ASSESSMENT — PAIN DESCRIPTION - DESCRIPTORS: DESCRIPTORS: THROBBING;NUMBNESS;TINGLING

## 2023-03-08 ASSESSMENT — PAIN - FUNCTIONAL ASSESSMENT: PAIN_FUNCTIONAL_ASSESSMENT: 0-10

## 2023-03-08 ASSESSMENT — PAIN DESCRIPTION - ORIENTATION: ORIENTATION: LEFT

## 2023-03-08 ASSESSMENT — PAIN SCALES - GENERAL: PAINLEVEL_OUTOF10: 8

## 2023-03-08 ASSESSMENT — PAIN DESCRIPTION - PAIN TYPE: TYPE: ACUTE PAIN
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
## 2023-03-08 NOTE — Clinical Note
Madison Jones was seen and treated in our emergency department on 3/8/2023. He may return to work on 03/10/2023. If you have any questions or concerns, please don't hesitate to call.       Elsa Lopez, APRN - CNP

## 2023-08-03 ENCOUNTER — HOSPITAL ENCOUNTER (EMERGENCY)
Age: 33
Discharge: HOME | End: 2023-08-03
Payer: COMMERCIAL

## 2023-08-03 VITALS
RESPIRATION RATE: 16 BRPM | BODY MASS INDEX: 28 KG/M2 | OXYGEN SATURATION: 95 % | HEART RATE: 113 BPM | WEIGHT: 200 LBS | HEIGHT: 71 IN | DIASTOLIC BLOOD PRESSURE: 88 MMHG | SYSTOLIC BLOOD PRESSURE: 138 MMHG | TEMPERATURE: 98.9 F

## 2023-08-03 DIAGNOSIS — J20.9 ACUTE BRONCHITIS, UNSPECIFIED ORGANISM: Primary | ICD-10-CM

## 2023-08-03 PROCEDURE — 99213 OFFICE O/P EST LOW 20 MIN: CPT

## 2023-08-03 RX ORDER — PREDNISONE 20 MG/1
20 TABLET ORAL 2 TIMES DAILY
Qty: 10 TABLET | Refills: 0 | Status: SHIPPED | OUTPATIENT
Start: 2023-08-03 | End: 2023-08-08

## 2023-08-03 RX ORDER — BROMPHENIRAMINE MALEATE, PSEUDOEPHEDRINE HYDROCHLORIDE, AND DEXTROMETHORPHAN HYDROBROMIDE 2; 30; 10 MG/5ML; MG/5ML; MG/5ML
SYRUP ORAL 4 TIMES DAILY PRN
COMMUNITY

## 2023-08-03 RX ORDER — ALBUTEROL SULFATE 90 UG/1
2 AEROSOL, METERED RESPIRATORY (INHALATION) 4 TIMES DAILY PRN
Qty: 54 G | Refills: 0 | Status: SHIPPED | OUTPATIENT
Start: 2023-08-03

## 2023-08-03 RX ORDER — BENZONATATE 100 MG/1
100-200 CAPSULE ORAL 3 TIMES DAILY PRN
Qty: 60 CAPSULE | Refills: 0 | Status: SHIPPED | OUTPATIENT
Start: 2023-08-03 | End: 2023-08-10

## 2023-08-03 RX ORDER — CETIRIZINE HYDROCHLORIDE 10 MG/1
10 TABLET ORAL DAILY
COMMUNITY

## 2023-08-03 RX ORDER — AZITHROMYCIN 250 MG/1
TABLET, FILM COATED ORAL
Qty: 1 PACKET | Refills: 0 | Status: SHIPPED | OUTPATIENT
Start: 2023-08-03 | End: 2023-08-07

## 2023-08-03 ASSESSMENT — PAIN DESCRIPTION - PAIN TYPE: TYPE: ACUTE PAIN

## 2023-08-03 ASSESSMENT — PAIN - FUNCTIONAL ASSESSMENT
PAIN_FUNCTIONAL_ASSESSMENT: PREVENTS OR INTERFERES SOME ACTIVE ACTIVITIES AND ADLS
PAIN_FUNCTIONAL_ASSESSMENT: 0-10

## 2023-08-03 ASSESSMENT — ENCOUNTER SYMPTOMS
SORE THROAT: 1
COUGH: 1

## 2023-08-03 ASSESSMENT — PAIN DESCRIPTION - DESCRIPTORS: DESCRIPTORS: DISCOMFORT

## 2023-08-03 ASSESSMENT — PAIN DESCRIPTION - LOCATION: LOCATION: THROAT

## 2023-08-03 ASSESSMENT — PAIN SCALES - GENERAL: PAINLEVEL_OUTOF10: 7

## 2023-08-03 NOTE — ED TRIAGE NOTES
Patient ambulated to room with complaint of body ache, cough and sore throat that started 2 days ago

## 2023-08-03 NOTE — DISCHARGE INSTRUCTIONS
Tessalon perles -cough  Prednisone -steroid  Zpack - antibiotic  Albuterol inhaler as needed  ER for worsening symptoms

## 2023-10-20 ENCOUNTER — HOSPITAL ENCOUNTER (EMERGENCY)
Age: 33
Discharge: HOME | End: 2023-10-20
Payer: COMMERCIAL

## 2023-10-20 VITALS
WEIGHT: 190 LBS | HEART RATE: 97 BPM | DIASTOLIC BLOOD PRESSURE: 78 MMHG | RESPIRATION RATE: 16 BRPM | TEMPERATURE: 97.2 F | BODY MASS INDEX: 26.6 KG/M2 | HEIGHT: 71 IN | SYSTOLIC BLOOD PRESSURE: 155 MMHG | OXYGEN SATURATION: 96 %

## 2023-10-20 DIAGNOSIS — J40 BRONCHITIS: Primary | ICD-10-CM

## 2023-10-20 PROCEDURE — 99213 OFFICE O/P EST LOW 20 MIN: CPT

## 2023-10-20 RX ORDER — PREDNISONE 20 MG/1
20 TABLET ORAL 2 TIMES DAILY
Qty: 10 TABLET | Refills: 0 | Status: SHIPPED | OUTPATIENT
Start: 2023-10-20 | End: 2023-10-25

## 2023-10-20 ASSESSMENT — ENCOUNTER SYMPTOMS
SINUS PRESSURE: 0
WHEEZING: 0
SHORTNESS OF BREATH: 0
DIARRHEA: 0
SORE THROAT: 0
VOMITING: 0
RHINORRHEA: 1
COUGH: 1
NAUSEA: 0
ABDOMINAL PAIN: 0

## 2023-10-20 ASSESSMENT — PAIN - FUNCTIONAL ASSESSMENT: PAIN_FUNCTIONAL_ASSESSMENT: NONE - DENIES PAIN

## 2024-02-27 ENCOUNTER — HOSPITAL ENCOUNTER (EMERGENCY)
Age: 34
Discharge: HOME | End: 2024-02-27
Payer: COMMERCIAL

## 2024-02-27 VITALS
WEIGHT: 195 LBS | BODY MASS INDEX: 27.3 KG/M2 | DIASTOLIC BLOOD PRESSURE: 87 MMHG | HEART RATE: 90 BPM | RESPIRATION RATE: 16 BRPM | HEIGHT: 71 IN | TEMPERATURE: 98.4 F | OXYGEN SATURATION: 96 % | SYSTOLIC BLOOD PRESSURE: 139 MMHG

## 2024-02-27 DIAGNOSIS — J02.9 ACUTE PHARYNGITIS, UNSPECIFIED ETIOLOGY: Primary | ICD-10-CM

## 2024-02-27 LAB — S PYO AG THROAT QL: NEGATIVE

## 2024-02-27 PROCEDURE — 87651 STREP A DNA AMP PROBE: CPT

## 2024-02-27 PROCEDURE — 99213 OFFICE O/P EST LOW 20 MIN: CPT | Performed by: NURSE PRACTITIONER

## 2024-02-27 PROCEDURE — 99213 OFFICE O/P EST LOW 20 MIN: CPT

## 2024-02-27 RX ORDER — DEXTROMETHORPHAN HYDROBROMIDE AND PROMETHAZINE HYDROCHLORIDE 15; 6.25 MG/5ML; MG/5ML
10 SYRUP ORAL NIGHTLY PRN
Qty: 70 ML | Refills: 0 | Status: SHIPPED | OUTPATIENT
Start: 2024-02-27 | End: 2024-03-05

## 2024-02-27 RX ORDER — PSEUDOEPHEDRINE HCL 120 MG/1
120 TABLET, FILM COATED, EXTENDED RELEASE ORAL EVERY 12 HOURS
Qty: 14 TABLET | Refills: 0 | Status: SHIPPED | OUTPATIENT
Start: 2024-02-27 | End: 2024-03-05

## 2024-02-27 ASSESSMENT — ENCOUNTER SYMPTOMS
SHORTNESS OF BREATH: 1
CHOKING: 0
RHINORRHEA: 1
SINUS PAIN: 0
SWOLLEN GLANDS: 0
SORE THROAT: 1
COUGH: 1
SINUS PRESSURE: 1
APNEA: 0
CHEST TIGHTNESS: 0
STRIDOR: 0
WHEEZING: 0

## 2024-02-27 ASSESSMENT — PAIN DESCRIPTION - ONSET: ONSET: GRADUAL

## 2024-02-27 ASSESSMENT — PAIN DESCRIPTION - FREQUENCY: FREQUENCY: INTERMITTENT

## 2024-02-27 ASSESSMENT — PAIN - FUNCTIONAL ASSESSMENT
PAIN_FUNCTIONAL_ASSESSMENT: ACTIVITIES ARE NOT PREVENTED
PAIN_FUNCTIONAL_ASSESSMENT: 0-10

## 2024-02-27 ASSESSMENT — PAIN DESCRIPTION - DESCRIPTORS: DESCRIPTORS: SORE

## 2024-02-27 ASSESSMENT — PAIN DESCRIPTION - LOCATION: LOCATION: THROAT

## 2024-02-27 ASSESSMENT — PAIN SCALES - GENERAL: PAINLEVEL_OUTOF10: 5

## 2024-02-27 ASSESSMENT — PAIN DESCRIPTION - PAIN TYPE: TYPE: ACUTE PAIN

## 2024-02-27 NOTE — ED PROVIDER NOTES
[facility] URGENT CARE  Urgent Care Encounter      CHIEF COMPLAINT       Chief Complaint   Patient presents with    Cough    Pharyngitis       Nurses Notes reviewed and I agree except as noted in the HPI.  HISTORY OFPRESENT ILLNESS   Dinesh Zapata is a 34 y.o.  The history is provided by the patient. No  was used.   URI  Presenting symptoms: congestion, cough, fatigue, rhinorrhea and sore throat    Presenting symptoms: no ear pain, no facial pain and no fever    Severity:  Severe  Onset quality:  Gradual  Duration:  3 days  Timing:  Constant  Progression:  Worsening  Chronicity:  New  Relieved by:  Nothing  Worsened by:  Certain positions  Ineffective treatments:  OTC medications  Associated symptoms: headaches    Associated symptoms: no arthralgias, no myalgias, no neck pain, no sinus pain, no sneezing, no swollen glands and no wheezing    Risk factors: sick contacts    Risk factors: not elderly, no chronic cardiac disease, no chronic kidney disease, no chronic respiratory disease, no diabetes mellitus, no immunosuppression, no recent illness and no recent travel        REVIEW OF SYSTEMS     Review of Systems   Constitutional:  Positive for fatigue. Negative for activity change, appetite change, chills, diaphoresis and fever.   HENT:  Positive for congestion, postnasal drip, rhinorrhea, sinus pressure and sore throat. Negative for ear pain, sinus pain and sneezing.    Respiratory:  Positive for cough and shortness of breath. Negative for apnea, choking, chest tightness, wheezing and stridor.    Cardiovascular:  Negative for chest pain, palpitations and leg swelling.   Musculoskeletal:  Negative for arthralgias, myalgias and neck pain.   Neurological:  Positive for headaches.       PAST MEDICAL HISTORY   History reviewed. No pertinent past medical history.    SURGICAL HISTORY     Patient  has a past surgical history that includes Ankle surgery (Right, 2012); back surgery;  and Shoulder arthroscopy (Left, 1/27/2022).    CURRENT MEDICATIONS       Discharge Medication List as of 2/27/2024  8:39 AM          ALLERGIES     Patient is has No Known Allergies.    FAMILY HISTORY     Patient's family history includes Arthritis in his father; Diabetes in his father; High Blood Pressure in his father; Obesity in his father.    SOCIAL HISTORY     Patient  reports that he has been smoking cigars. He has never used smokeless tobacco. He reports current alcohol use of about 37.0 standard drinks of alcohol per week. He reports that he does not currently use drugs after having used the following drugs: Marijuana (Weed).    PHYSICAL EXAM     ED TRIAGE VITALS  BP: 139/87, Temp: 98.4 °F (36.9 °C), Pulse: 90, Respirations: 16, SpO2: 96 %  Physical Exam  Vitals and nursing note reviewed.   Constitutional:       General: He is not in acute distress.     Appearance: He is well-developed and normal weight. He is not ill-appearing, toxic-appearing or diaphoretic.   HENT:      Head: Normocephalic and atraumatic.      Right Ear: Tympanic membrane and ear canal normal. No drainage, swelling or tenderness. No middle ear effusion. Tympanic membrane is not erythematous.      Left Ear: Ear canal normal. No drainage, swelling or tenderness.  No middle ear effusion. Tympanic membrane is not erythematous.      Nose: Congestion and rhinorrhea present.      Mouth/Throat:      Mouth: Mucous membranes are moist. No oral lesions.      Pharynx: Uvula midline. Pharyngeal swelling, posterior oropharyngeal erythema and uvula swelling present. No oropharyngeal exudate.      Tonsils: No tonsillar exudate or tonsillar abscesses.   Eyes:      Conjunctiva/sclera: Conjunctivae normal.   Pulmonary:      Effort: Pulmonary effort is normal. No respiratory distress.      Breath sounds: Normal breath sounds. No stridor. No wheezing, rhonchi or rales.   Chest:      Chest wall: No tenderness.   Musculoskeletal:      Cervical back: Normal range

## 2024-02-27 NOTE — ED TRIAGE NOTES
Pt to [facility] ambulatory with a sore throat, and a cough.  This started on Saturday.  No fevers.

## 2024-03-14 ENCOUNTER — HOSPITAL ENCOUNTER (EMERGENCY)
Age: 34
Discharge: HOME | End: 2024-03-14
Payer: COMMERCIAL

## 2024-03-14 VITALS
WEIGHT: 200 LBS | OXYGEN SATURATION: 97 % | SYSTOLIC BLOOD PRESSURE: 172 MMHG | TEMPERATURE: 97.9 F | BODY MASS INDEX: 28 KG/M2 | DIASTOLIC BLOOD PRESSURE: 92 MMHG | HEIGHT: 71 IN | HEART RATE: 74 BPM | RESPIRATION RATE: 16 BRPM

## 2024-03-14 DIAGNOSIS — J06.9 ACUTE UPPER RESPIRATORY INFECTION: Primary | ICD-10-CM

## 2024-03-14 PROCEDURE — 99213 OFFICE O/P EST LOW 20 MIN: CPT

## 2024-03-14 PROCEDURE — 99214 OFFICE O/P EST MOD 30 MIN: CPT

## 2024-03-14 RX ORDER — ACETAMINOPHEN AND CHLORPHENIRAMINE MALEATE 325; 2 MG/1; MG/1
2 TABLET, FILM COATED ORAL EVERY 4 HOURS PRN
Qty: 80 TABLET | Refills: 0 | Status: SHIPPED | OUTPATIENT
Start: 2024-03-14 | End: 2024-03-21

## 2024-03-14 RX ORDER — AZITHROMYCIN 250 MG/1
TABLET, FILM COATED ORAL
Qty: 6 TABLET | Refills: 0 | Status: SHIPPED | OUTPATIENT
Start: 2024-03-14 | End: 2024-03-24

## 2024-03-14 RX ORDER — BENZONATATE 200 MG/1
200 CAPSULE ORAL 3 TIMES DAILY PRN
Qty: 30 CAPSULE | Refills: 0 | Status: SHIPPED | OUTPATIENT
Start: 2024-03-14 | End: 2024-03-21

## 2024-03-14 ASSESSMENT — PAIN - FUNCTIONAL ASSESSMENT: PAIN_FUNCTIONAL_ASSESSMENT: 0-10

## 2024-03-14 ASSESSMENT — PAIN SCALES - GENERAL: PAINLEVEL_OUTOF10: 8

## 2024-03-14 ASSESSMENT — PAIN DESCRIPTION - LOCATION: LOCATION: THROAT

## 2024-03-14 NOTE — DISCHARGE INSTRUCTIONS
Increase water intake, frequent hand washing.  Tylenol / Ibuprofen as needed for fever and or pain.  Follow up with PCP in 3-5 days if no improvement or sooner with worsening symptoms.

## 2024-03-15 ASSESSMENT — ENCOUNTER SYMPTOMS
ABDOMINAL PAIN: 0
VOMITING: 1
SORE THROAT: 1
NAUSEA: 1
WHEEZING: 0
COUGH: 1
SHORTNESS OF BREATH: 0

## 2024-08-05 ENCOUNTER — HOSPITAL ENCOUNTER (EMERGENCY)
Age: 34
Discharge: HOME | End: 2024-08-05
Payer: COMMERCIAL

## 2024-08-05 VITALS
OXYGEN SATURATION: 97 % | RESPIRATION RATE: 16 BRPM | SYSTOLIC BLOOD PRESSURE: 139 MMHG | HEART RATE: 90 BPM | TEMPERATURE: 98 F | DIASTOLIC BLOOD PRESSURE: 83 MMHG

## 2024-08-05 DIAGNOSIS — M54.50 ACUTE RIGHT-SIDED LOW BACK PAIN WITHOUT SCIATICA: ICD-10-CM

## 2024-08-05 DIAGNOSIS — J06.9 VIRAL UPPER RESPIRATORY ILLNESS: Primary | ICD-10-CM

## 2024-08-05 LAB
FLUAV RNA RESP QL NAA+PROBE: NOT DETECTED
FLUBV RNA RESP QL NAA+PROBE: NOT DETECTED
SARS-COV-2 RNA RESP QL NAA+PROBE: DETECTED

## 2024-08-05 PROCEDURE — 6360000002 HC RX W HCPCS

## 2024-08-05 PROCEDURE — 99213 OFFICE O/P EST LOW 20 MIN: CPT

## 2024-08-05 PROCEDURE — 96372 THER/PROPH/DIAG INJ SC/IM: CPT

## 2024-08-05 PROCEDURE — 87636 SARSCOV2 & INF A&B AMP PRB: CPT

## 2024-08-05 RX ORDER — BROMPHENIRAMINE MALEATE, PSEUDOEPHEDRINE HYDROCHLORIDE, AND DEXTROMETHORPHAN HYDROBROMIDE 2; 30; 10 MG/5ML; MG/5ML; MG/5ML
5 SYRUP ORAL 4 TIMES DAILY PRN
Qty: 118 ML | Refills: 0 | Status: SHIPPED | OUTPATIENT
Start: 2024-08-05 | End: 2024-08-11

## 2024-08-05 RX ORDER — KETOROLAC TROMETHAMINE 30 MG/ML
30 INJECTION, SOLUTION INTRAMUSCULAR; INTRAVENOUS ONCE
Status: COMPLETED | OUTPATIENT
Start: 2024-08-05 | End: 2024-08-05

## 2024-08-05 RX ADMIN — KETOROLAC TROMETHAMINE 30 MG: 30 INJECTION, SOLUTION INTRAMUSCULAR at 12:31

## 2024-08-05 ASSESSMENT — PAIN DESCRIPTION - ONSET: ONSET: ON-GOING

## 2024-08-05 ASSESSMENT — ENCOUNTER SYMPTOMS
ALLERGIC/IMMUNOLOGIC NEGATIVE: 1
BACK PAIN: 1
EYES NEGATIVE: 1
COUGH: 1

## 2024-08-05 ASSESSMENT — PAIN DESCRIPTION - LOCATION: LOCATION: BACK

## 2024-08-05 ASSESSMENT — PAIN - FUNCTIONAL ASSESSMENT: PAIN_FUNCTIONAL_ASSESSMENT: 0-10

## 2024-08-05 ASSESSMENT — PAIN DESCRIPTION - DESCRIPTORS: DESCRIPTORS: SORE

## 2024-08-05 ASSESSMENT — PAIN DESCRIPTION - ORIENTATION: ORIENTATION: LOWER

## 2024-08-05 ASSESSMENT — PAIN DESCRIPTION - PAIN TYPE: TYPE: ACUTE PAIN

## 2024-08-05 ASSESSMENT — PAIN SCALES - GENERAL: PAINLEVEL_OUTOF10: 6

## 2024-08-05 ASSESSMENT — PAIN DESCRIPTION - FREQUENCY: FREQUENCY: CONTINUOUS

## 2024-08-05 NOTE — DISCHARGE INSTRUCTIONS
Medications as prescribed.  Increase fluid intake.  Over the counter motrin and ibuprofen for pain.  Ice to area 20 minutes on 20 minutes off.  Follow up with family doctor in 3-5 days.  Go to emergency room for any new or worsening symptoms.
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
## 2024-08-05 NOTE — ED TRIAGE NOTES
Pt to UC with c/o back pain and lack of energy that started today. Pt reports he needs covid tested because his wife was positive

## 2025-02-26 ENCOUNTER — HOSPITAL ENCOUNTER (EMERGENCY)
Age: 35
Discharge: HOME | End: 2025-02-26
Payer: COMMERCIAL

## 2025-02-26 VITALS
RESPIRATION RATE: 14 BRPM | WEIGHT: 175 LBS | HEART RATE: 92 BPM | DIASTOLIC BLOOD PRESSURE: 100 MMHG | HEIGHT: 71 IN | SYSTOLIC BLOOD PRESSURE: 145 MMHG | TEMPERATURE: 97.7 F | BODY MASS INDEX: 24.5 KG/M2 | OXYGEN SATURATION: 98 %

## 2025-02-26 DIAGNOSIS — F17.200 CURRENT SMOKER: ICD-10-CM

## 2025-02-26 DIAGNOSIS — J40 BRONCHITIS: Primary | ICD-10-CM

## 2025-02-26 DIAGNOSIS — R03.0 ELEVATED BLOOD PRESSURE READING: ICD-10-CM

## 2025-02-26 PROCEDURE — 99213 OFFICE O/P EST LOW 20 MIN: CPT

## 2025-02-26 PROCEDURE — 99213 OFFICE O/P EST LOW 20 MIN: CPT | Performed by: NURSE PRACTITIONER

## 2025-02-26 RX ORDER — BENZONATATE 200 MG/1
200 CAPSULE ORAL 3 TIMES DAILY PRN
Qty: 30 CAPSULE | Refills: 0 | Status: SHIPPED | OUTPATIENT
Start: 2025-02-26 | End: 2025-03-08

## 2025-02-26 RX ORDER — PREDNISONE 20 MG/1
20 TABLET ORAL 2 TIMES DAILY
Qty: 10 TABLET | Refills: 0 | Status: SHIPPED | OUTPATIENT
Start: 2025-02-26 | End: 2025-03-03

## 2025-02-26 RX ORDER — ALBUTEROL SULFATE 90 UG/1
2 INHALANT RESPIRATORY (INHALATION) EVERY 4 HOURS PRN
Qty: 18 G | Refills: 0 | Status: SHIPPED | OUTPATIENT
Start: 2025-02-26

## 2025-02-26 ASSESSMENT — ENCOUNTER SYMPTOMS
RHINORRHEA: 1
COUGH: 1
WHEEZING: 1

## 2025-02-26 ASSESSMENT — PAIN - FUNCTIONAL ASSESSMENT: PAIN_FUNCTIONAL_ASSESSMENT: NONE - DENIES PAIN

## 2025-02-26 NOTE — ED TRIAGE NOTES
To room 1 c/o ocugh and runny nose \" Everyone at work sick\"  Pt states \"I live for a trip tomorrow and dont want to be sick\"

## 2025-02-26 NOTE — ED PROVIDER NOTES
[facility] URGENT CARE  UrgentCare Encounter      CHIEFCOMPLAINT       Chief Complaint   Patient presents with    Cough     \" I quit smoking 1 week ago coughing ever since.  I leave for a fishing trip tomorrow and I dont want to be sick\"       Nurses Notes reviewed and I agree except as noted in the HPI.  HISTORY OF PRESENT ILLNESS     Dinesh Zapata is a 35 y.o. male who presents to the urgent care for evaluation of a cough that started 1 week ago after quitting smoking weed, but continues to smoke cigars.. No fevers.     The patient/patient representative has no other acute complaints at this time.    REVIEW OF SYSTEMS     Review of Systems   HENT:  Positive for rhinorrhea.    Respiratory:  Positive for cough and wheezing.    All other systems reviewed and are negative.      PAST MEDICAL HISTORY         Diagnosis Date    Hypertension        SURGICAL HISTORY     Patient  has a past surgical history that includes Ankle surgery (Right, 2012); back surgery; and Shoulder arthroscopy (Left, 1/27/2022).    CURRENT MEDICATIONS       Previous Medications    No medications on file       ALLERGIES     Patient is has No Known Allergies.    FAMILY HISTORY     Patient'sfamily history includes Arthritis in his father; Diabetes in his father; High Blood Pressure in his father; Obesity in his father.    SOCIAL HISTORY     Patient  reports that he has been smoking cigars. He has never used smokeless tobacco. He reports current alcohol use of about 37.0 standard drinks of alcohol per week. He reports that he does not currently use drugs after having used the following drugs: Marijuana (Weed).    PHYSICAL EXAM     ED TRIAGE VITALS  BP: (!) 145/100, Temp: 97.7 °F (36.5 °C), Pulse: 92, Respirations: 14, SpO2: 98 %  Physical Exam  Vitals and nursing note reviewed.   Constitutional:       General: He is not in acute distress.     Appearance: Normal appearance. He is well-developed and well-groomed.   HENT:      Right Ear:

## 2025-02-26 NOTE — DISCHARGE INSTRUCTIONS
Your blood pressure is elevated and will need to be monitored. Have this rechecked at your primary care care provider's office sometime within the next week. Untreated elevated blood pressure puts you at risk for stroke, heart attack, kidney disease and other serious medical conditions

## 2025-03-11 DIAGNOSIS — J40 BRONCHITIS: ICD-10-CM

## 2025-03-11 RX ORDER — ALBUTEROL SULFATE 90 UG/1
2 INHALANT RESPIRATORY (INHALATION) EVERY 4 HOURS PRN
Qty: 18 G | Refills: 0 | OUTPATIENT
Start: 2025-03-11

## 2025-04-30 ENCOUNTER — HOSPITAL ENCOUNTER (EMERGENCY)
Age: 35
Discharge: HOME | End: 2025-04-30
Payer: COMMERCIAL

## 2025-04-30 VITALS
HEIGHT: 70 IN | SYSTOLIC BLOOD PRESSURE: 189 MMHG | TEMPERATURE: 97.8 F | DIASTOLIC BLOOD PRESSURE: 99 MMHG | OXYGEN SATURATION: 97 % | HEART RATE: 92 BPM | RESPIRATION RATE: 16 BRPM | WEIGHT: 195 LBS | BODY MASS INDEX: 27.92 KG/M2

## 2025-04-30 DIAGNOSIS — I10 HYPERTENSION, UNSPECIFIED TYPE: Primary | ICD-10-CM

## 2025-04-30 DIAGNOSIS — G44.1 OTHER VASCULAR HEADACHE: ICD-10-CM

## 2025-04-30 PROCEDURE — 99213 OFFICE O/P EST LOW 20 MIN: CPT | Performed by: EMERGENCY MEDICINE

## 2025-04-30 PROCEDURE — 99214 OFFICE O/P EST MOD 30 MIN: CPT

## 2025-04-30 RX ORDER — CHLORTHALIDONE 25 MG/1
12.5 TABLET ORAL DAILY
Qty: 30 TABLET | Refills: 0 | Status: SHIPPED | OUTPATIENT
Start: 2025-04-30

## 2025-04-30 ASSESSMENT — ENCOUNTER SYMPTOMS
BACK PAIN: 0
SHORTNESS OF BREATH: 0

## 2025-04-30 ASSESSMENT — PAIN DESCRIPTION - LOCATION: LOCATION: HEAD

## 2025-04-30 ASSESSMENT — PAIN - FUNCTIONAL ASSESSMENT: PAIN_FUNCTIONAL_ASSESSMENT: 0-10

## 2025-04-30 NOTE — DISCHARGE INSTRUCTIONS
Monitor your blood pressure in the morning and evening daily and keep a log    Try to decrease the sodium in your diet.  Recommend DASH diet    Begin chlorthalidone as prescribed.  1/2 tablet daily    Follow-up with family physician as soon as possible for further management.  If you are unable to get in with Dr. Dasilva as planned, you may call [facility] for an appt.    Go to the emergency department for severe headache that is not controlled with over-the-counter medications

## 2025-04-30 NOTE — ED TRIAGE NOTES
To room 1 c/o high bp reading at work today  Pt reeports he has been having headaches with activity onand off x 1.5 weeks.  Pt denies historyof HTN althoughit is listed in EPIC

## 2025-04-30 NOTE — ED PROVIDER NOTES
[facility] URGENT CARE  Urgent Care Encounter       CHIEF COMPLAINT       Chief Complaint   Patient presents with    Headache     On nad off x 1.5 weeks- with strenous activity    Hypertension     Bp checked at work today 155/102       Nurses Notes reviewed and I agree except as noted in the HPI.  HISTORY OF PRESENT ILLNESS   Dinesh Zapata is a 35 y.o. male who presents for complaints of headache that has been occurring when he is working out.  He also has been monitoring his blood pressure and notices his blood pressure has been elevated.  Patient states that he works out frequently.  He does use intramuscular testosterone.  He states he is a clean eater and is on a high-protein diet.  He has been told in the past that his blood pressure has been elevated but has never been as elevated as it currently is.  His blood pressures at home have been typically in the 150s/mid 90s.  Patient states that several times now, he has been performing dumbbell bench press and during the middle of the set developed a severe left-sided headache.  States this did not clear up after completing the set.  He has taken over-the-counter Excedrin which helped with the headache but states it would take several hours.  He has seen a chiropractor for adjustments.  He states today at work he had to lift a 4 inch pipe overhead.  States that headache reoccurred then.  He seen medical at the facility who advised him of elevated blood pressure readings and advised to seek treatment.    No chest pain.  No palpitations.  No shortness of breath.  No leg swelling.    HPI    REVIEW OF SYSTEMS     Review of Systems   Constitutional:  Negative for activity change, fatigue and fever.   Respiratory:  Negative for shortness of breath.    Cardiovascular:  Negative for chest pain and leg swelling.   Genitourinary:  Negative for difficulty urinating, frequency and urgency.   Musculoskeletal:  Negative for back pain.   Neurological:  Positive for

## (undated) DEVICE — KIT,ANTI FOG,W/SPONGE & FLUID,SOFT PACK: Brand: MEDLINE

## (undated) DEVICE — 1.4MM ICONIX DISPOSABLE DRILL: Brand: ICONIX

## (undated) DEVICE — BLADE SAW RESECTOR CUT 4MM

## (undated) DEVICE — COVER,LIGHT HANDLE,FLX,2/PK: Brand: MEDLINE INDUSTRIES, INC.

## (undated) DEVICE — SOLUTION IRRIG 3000ML 0.9% SOD CHL USP UROMATIC PLAS CONT

## (undated) DEVICE — NEEDLE SPNL L3.5IN PNK HUB S STL REG WALL FIT STYL W/ QNCKE

## (undated) DEVICE — DRAPE,U/SHT,SPLIT,FILM,60X84,STERILE: Brand: MEDLINE

## (undated) DEVICE — SLIDER SUT NDL NIT SHUTTLE CHAMPION

## (undated) DEVICE — GOWN,SIRUS,NONRNF,SETINSLV,XL,20/CS: Brand: MEDLINE

## (undated) DEVICE — DRESSING,GAUZE,XEROFORM,CURAD,5"X9",ST: Brand: CURAD

## (undated) DEVICE — [ARTHROSCOPY PUMP,  DO NOT USE IF PACKAGE IS DAMAGED,  KEEP DRY,  KEEP AWAY FROM SUNLIGHT,  PROTECT FROM HEAT AND RADIOACTIVE SOURCES.]: Brand: FLOSTEADY

## (undated) DEVICE — CONTAINER,SPECIMEN,PNEU TUBE,4OZ,OR STRL: Brand: MEDLINE

## (undated) DEVICE — SHOULDER STABILIZATION KIT,                                    DISPOSABLE 12 PER BOX

## (undated) DEVICE — 3M™ IOBAN™ 2 ANTIMICROBIAL INCISE DRAPE 6650EZ: Brand: IOBAN™ 2

## (undated) DEVICE — SLING ORTH COMFORTABLE LG 13-15 HND STRP HK ULTRASLING III

## (undated) DEVICE — GLOVE SURG SZ 75 L12IN THK75MIL DK GRN LTX FREE

## (undated) DEVICE — SUTURE PROL SZ 3-0 L18IN NONABSORBABLE BLU L30MM FS-1 3/8 8663G

## (undated) DEVICE — TUBING, SUCTION, 1/4" X 20', STRAIGHT: Brand: MEDLINE INDUSTRIES, INC.

## (undated) DEVICE — TAPE SURG W4INXL11YD 2IN PERF LINERLESS NONWOVEN MEDFIX EZ

## (undated) DEVICE — MARKER,SKIN,WI/RULER AND LABELS: Brand: MEDLINE

## (undated) DEVICE — GLOVE ORANGE PI 7 1/2   MSG9075

## (undated) DEVICE — APPLICATOR MEDICATED 26 CC SOLUTION HI LT ORNG CHLORAPREP

## (undated) DEVICE — COUNTER NDL 40 COUNT HLD 70 FOAM BLK ADH W/ MAG

## (undated) DEVICE — [STANDARD 12-FLUTE ROUND BUR, ARTHROSCOPIC SHAVER BLADE,  DO NOT RESTERILIZE,  DO NOT USE IF PACKAGE IS DAMAGED,  KEEP DRY,  KEEP AWAY FROM SUNLIGHT]: Brand: FORMULA

## (undated) DEVICE — [STANDARD 12-FLUTE BARREL BUR, ARTHROSCOPIC SHAVER BLADE,  DO NOT RESTERILIZE,  DO NOT USE IF PACKAGE IS DAMAGED,  KEEP DRY,  KEEP AWAY FROM SUNLIGHT]: Brand: FORMULA

## (undated) DEVICE — SYRINGE MED 30ML STD CLR PLAS LUERLOCK TIP N CTRL DISP

## (undated) DEVICE — Device

## (undated) DEVICE — NEEDLE SUT 45DEG L SLDE CHAMPION

## (undated) DEVICE — T-MAX DISPOSABLE FACE MASK 8 PER BOX

## (undated) DEVICE — BASIC SINGLE BASIN BTC-LF: Brand: MEDLINE INDUSTRIES, INC.

## (undated) DEVICE — INTENDED FOR TISSUE SEPARATION, AND OTHER PROCEDURES THAT REQUIRE A SHARP SURGICAL BLADE TO PUNCTURE OR CUT.: Brand: BARD-PARKER ® CARBON RIB-BACK BLADES

## (undated) DEVICE — PACK-MAJOR

## (undated) DEVICE — NEEDLE 45DEG R ORTH SLDE

## (undated) DEVICE — PAD,ABDOMINAL,5"X9",ST,LF,25/BX: Brand: MEDLINE INDUSTRIES, INC.

## (undated) DEVICE — [THREADED CANNULA.  DO NOT RESTERILIZE,  DO NOT USE IF PACKAGE IS DAMAGED]: Brand: DRI-LOK